# Patient Record
Sex: FEMALE | Race: BLACK OR AFRICAN AMERICAN | Employment: OTHER | URBAN - METROPOLITAN AREA
[De-identification: names, ages, dates, MRNs, and addresses within clinical notes are randomized per-mention and may not be internally consistent; named-entity substitution may affect disease eponyms.]

---

## 2017-04-03 ENCOUNTER — OFFICE VISIT (OUTPATIENT)
Dept: CARDIOLOGY CLINIC | Age: 67
End: 2017-04-03

## 2017-04-03 VITALS
RESPIRATION RATE: 20 BRPM | HEART RATE: 86 BPM | HEIGHT: 63 IN | OXYGEN SATURATION: 94 % | BODY MASS INDEX: 29.41 KG/M2 | DIASTOLIC BLOOD PRESSURE: 72 MMHG | WEIGHT: 166 LBS | SYSTOLIC BLOOD PRESSURE: 120 MMHG

## 2017-04-03 DIAGNOSIS — E11.8 TYPE 2 DIABETES MELLITUS WITH COMPLICATION, WITHOUT LONG-TERM CURRENT USE OF INSULIN (HCC): ICD-10-CM

## 2017-04-03 DIAGNOSIS — I73.9 PERIPHERAL VASCULAR DISEASE, UNSPECIFIED (HCC): ICD-10-CM

## 2017-04-03 DIAGNOSIS — E78.5 DYSLIPIDEMIA: ICD-10-CM

## 2017-04-03 DIAGNOSIS — I25.10 CORONARY ARTERY DISEASE DUE TO LIPID RICH PLAQUE: Primary | ICD-10-CM

## 2017-04-03 DIAGNOSIS — I10 ESSENTIAL HYPERTENSION, BENIGN: ICD-10-CM

## 2017-04-03 DIAGNOSIS — I25.83 CORONARY ARTERY DISEASE DUE TO LIPID RICH PLAQUE: Primary | ICD-10-CM

## 2017-04-03 NOTE — PATIENT INSTRUCTIONS
[email or web address].CL Activation    Thank you for requesting access to [email or web address]. Please follow the instructions below to securely access and download your online medical record. [email or web address] allows you to send messages to your doctor, view your test results, renew your prescriptions, schedule appointments, and more. How Do I Sign Up? 1. In your internet browser, go to [email or web address]  2. Click on the First Time User? Click Here link in the Sign In box. You will be redirect to the New Member Sign Up page. 3. Enter your JUNTA.CL Access Code exactly as it appears below. You will not need to use this code after youve completed the sign-up process. If you do not sign up before the expiration date, you must request a new code. [email or web address].CL Access Code: [email or web address]  Expires: 2017 12:53 PM (This is the date your [email or web address].CL access code will )    4. Enter the last four digits of your Social Security Number (xxxx) and Date of Birth (mm/dd/yyyy) as indicated and click Submit. You will be taken to the next sign-up page. 5. Create a JUNTA.CL ID. This will be your JUNTA.CL login ID and cannot be changed, so think of one that is secure and easy to remember. 6. Create a JUNTA.CL password. You can change your password at any time. 7. Enter your Password Reset Question and Answer. This can be used at a later time if you forget your password. 8. Enter your e-mail address. You will receive e-mail notification when new information is available in [address]. 9. Click Sign Up. You can now view and download portions of your medical record. 10. Click the Download Summary menu link to download a portable copy of your medical information. Additional Information    If you have questions, please visit the Frequently Asked Questions section of the [email or web address] website at [email or web address] Remember, [email or web address] is NOT to be used for urgent needs. For medical emergencies, dial 911.            Learning About Coronary Artery Disease (CAD)  What is coronary artery disease? Coronary artery disease (CAD) occurs when plaque builds up in the arteries that bring oxygen-rich blood to your heart. Plaque is a fatty substance made of cholesterol, calcium, and other substances in the blood. This process is called hardening of the arteries, or atherosclerosis. What happens when you have coronary artery disease? · Plaque may narrow the coronary arteries. Narrowed arteries cause poor blood flow. This can lead to angina symptoms such as chest pain or discomfort. If blood flow is completely blocked, you could have a heart attack. · You can slow CAD and reduce the risk of future problems by making changes in your lifestyle. These include quitting smoking and eating heart-healthy foods. · Treatments for CAD, along with changes in your lifestyle, can help you live a longer and healthier life. How can you prevent coronary artery disease? · Do not smoke. It may be the best thing you can do to prevent heart disease. If you need help quitting, talk to your doctor about stop-smoking programs and medicines. These can increase your chances of quitting for good. · Be active. Get at least 30 minutes of exercise on most days of the week. Walking is a good choice. You also may want to do other activities, such as running, swimming, cycling, or playing tennis or team sports. · Eat heart-healthy foods. Eat more fruits and vegetables and less foods that contain saturated and trans fats. Limit alcohol, sodium, and sweets. · Stay at a healthy weight. Lose weight if you need to. · Manage other health problems such as diabetes, high blood pressure, and high cholesterol. · Manage stress. Stress can hurt your heart. To keep stress low, talk about your problems and feelings. Don't keep your feelings hidden. · If you have talked about it with your doctor, take a low-dose aspirin every day.  Aspirin can help certain people lower their risk of a heart attack or stroke. But taking aspirin isn't right for everyone, because it can cause serious bleeding. Do not start taking daily aspirin unless your doctor knows about it. How is coronary artery disease treated? · Your doctor will suggest that you make lifestyle changes. For example, your doctor may ask you to eat healthy foods, quit smoking, lose extra weight, and be more active. · You will have to take medicines. · Your doctor may suggest a procedure to open narrowed or blocked arteries. This is called angioplasty. Or your doctor may suggest using healthy blood vessels to create detours around narrowed or blocked arteries. This is called bypass surgery. Follow-up care is a key part of your treatment and safety. Be sure to make and go to all appointments, and call your doctor if you are having problems. It's also a good idea to know your test results and keep a list of the medicines you take. Where can you learn more? Go to http://agapito-lilian.info/. Enter [phone] in the search box to learn more about \"Learning About Coronary Artery Disease (CAD). \"  Current as of: January 27, 2016  Content Version: 11.2  © 9101-6390 [employer]. Care instructions adapted under license by [employer] (which disclaims liability or warranty for this information). If you have questions about a medical condition or this instruction, always ask your healthcare professional. [address] any warranty or liability for your use of this information.

## 2017-04-03 NOTE — PROGRESS NOTES
Subjective:     Problem List  Date Reviewed: 4/3/2017          Codes Class Noted    Coronary artery disease ICD-10-CM: I25.10  ICD-9-CM: 414.00  1/16/2013    Overview Signed 1/16/2013 10:40 AM by Mojgan Capps MD     a. Cath (12/4/3): LAD p30, m40. RI 95. LCx small. RCA p99, m80; LVBr 40. EF 55%. b. PCI (12/4/3): RI 2.5x18 Cypher. pRCA 3.0x12 Vision, mRCA 2.5x23 Cypher.  c. Cath (9/7/6): LAD m30. LCx p40. RCA d40. EF 65%. No AVG/MR. Peripheral vascular disease ICD-10-CM: I73.9  ICD-9-CM: 443.9  1/16/2013    Overview Signed 1/16/2013 10:41 AM by Mojgan Capps MD     a. ABIs (5/23/3): Right 0.66, Left 0.64.  b. Angio (9/7/6): Mild bilateral renal and iliac disease. Right SFA 85%, left SFA 80%. 3 vessel runoff bilaterally. c. PTA Right SFA (9/14/6): 6x120 Protege. d. PTA Left SFA (9/21/6): 6.0x120 Protege. Dyslipidemia ICD-10-CM: E78.5  ICD-9-CM: 272.4  1/16/2013        Essential hypertension, benign ICD-10-CM: I10  ICD-9-CM: 401.1  1/16/2013        Diabetes mellitus ([facility] [address] ICD-10-CM: E11.9  ICD-9-CM: 250.00  1/16/2013              Ms. Uriel Bynum is a 77 y.o. woman with the above past medical history, who presents for follow up of her coronary artery disease. She is doing well from a cardiac standpoint. She tries to exercise on what sounds like an elliptical type machine about three times per week for about ten minutes at a time. She does this without any active cardiopulmonary symptoms. The only thing that she gets that limits her physical activity is that of left hip pain. She denies any chest pain, chest discomfort, shortness of breath, dyspnea on exertion, orthopnea, paroxysmal nocturnal dyspnea, lower extremity swelling, palpitations, syncope or near syncope. Her primary care physician is following her lipids.           History   Smoking Status    Former Smoker    Quit date: 11/1/2015   Smokeless Tobacco    Never Used       Current Outpatient Prescriptions   Medication Sig Dispense Refill    pyridoxine (VITAMIN B-6) 25 mg tablet Take 25 mg by mouth daily.  cyanocobalamin (VITAMIN B12) 500 mcg tablet Take 500 mcg by mouth daily.  MULTIVITAMIN WITH MINERALS (HAIR,SKIN & NAILS PO) Take  by mouth.  cholecalciferol, vitamin D3, 2,000 unit tab Take  by mouth daily.  cetirizine (ZYRTEC) 10 mg tablet Take 10 mg by mouth daily.  aspirin 81 mg tablet Take 81 mg by mouth.  losartan-hydrochlorothiazide (HYZAAR) 100-25 mg per tablet Take 1 Tab by mouth daily.  Nisoldipine 34 mg Tb24 Take  by mouth daily.  metFORMIN (GLUCOPHAGE) 500 mg tablet Take 1,000 mg by mouth two (2) times daily (with meals).  sitaGLIPtin (JANUVIA) 50 mg tablet Take 50 mg by mouth daily.  atorvastatin (LIPITOR) 40 mg tablet Take  by mouth daily.  CALCIUM CARBONATE/VITAMIN D3 (CALCARB 600 WITH VITAMIN D PO) Take  by mouth. Objective:     Visit Vitals    /72 (BP 1 Location: Left arm, BP Patient Position: Sitting)    Pulse 86    Resp 20    Ht 5' 3\" (1.6 m)    Wt 166 lb (75.3 kg)    SpO2 94%    BMI 29.41 kg/m2       HEENT Exam:     Normocephalic, atraumatic. EOMI. Oropharynx negative. Neck supple. No lymphadenopathy. Lung Exam:     The patient is not dyspneic. There is no cough. The lungs are clear to percussion. Breath sounds are heard equally in all lung fields. There are no wheezes, rales, rhonchi, or rubs heard on auscultation. Heart Exam:     The rhythm is regular. The PMI is in the 5th intercostal space of the MCL. Apical impulse is normal. S1 is regular. S2 is physiologic. There is no S3, S4 gallop, click, or rub. 2/6 SM @ LUSB with local radiation. Abdomen Exam:     Bowel sounds are normoactive. Abdomen benign. Extremities Exam:     The extremities are atraumatic appearing. There is no clubbing, cyanosis, edema, ulcers, varicose veins, rash, swelling, erythemia noted in the extremities.  The neurovascular status is grossly intact with normal distal sensation and pulses. Vascular Exam:     The radial, brachial, dorsalis pedis, posterior tibial, are equal and strong bilaterally The carotids are equal bilaterally without bruits. EKG: NSR @ 90, 2 fusion beats, IL NSSTTW abn. Assessment/Plan:     Ms. Bernardo Quintana appears stable from a cardiac standpoint. We are going to stay the course with her current medication regimen, and she is going to follow up with me in one year's time. We discussed diet and exercise. Plan:  1. Continue outpatient medication regimen. 2. Follow up with me in one year's time. 3. Diet and exercise. 4. Call my office, call her primary care physician, or return to the hospital should any concerning symptomatology arise. Ms. Bernardo Quintana indicated that she understood this plan and wished to proceed ahead.              Patient Care Team:  Beatriz Landeros MD as PCP - General (Family Practice)  Keesha Ricci MD as Physician (Cardiology)

## 2017-04-03 NOTE — MR AVS SNAPSHOT
Visit Information Date & Time Provider Department Dept. Phone Encounter #  
 4/3/2017  1:00 PM Sarah Garrison MD [facility] T[facility] Tam[facility]o [phone] [id number] Upcoming Health Maintenance Date Due Hepatitis C Screening 1950 HEMOGLOBIN A1C Q6M 1950 LIPID PANEL Q1 1950 FOOT EXAM Q1 5/27/1960 MICROALBUMIN Q1 5/27/1960 EYE EXAM RETINAL OR DILATED Q1 5/27/1960 DTaP/Tdap/Td series (1 - Tdap) 5/27/1971 BREAST CANCER SCRN MAMMOGRAM 5/27/2000 FOBT Q 1 YEAR AGE 50-75 5/27/2000 ZOSTER VACCINE AGE 60> 5/27/2010 GLAUCOMA SCREENING Q2Y 5/27/2015 OSTEOPOROSIS SCREENING (DEXA) 5/27/2015 Pneumococcal 65+ Low/Medium Risk (1 of 2 - PCV13) 5/27/2015 MEDICARE YEARLY EXAM 5/27/2015 INFLUENZA AGE 9 TO ADULT 8/1/2016 Allergies as of 4/3/2017  Review Complete On: 4/3/2017 By: Sarah Garrison MD  
 No Known Allergies Current Immunizations  Never Reviewed No immunizations on file. Not reviewed this visit You Were Diagnosed With   
  
 Codes Comments Coronary artery disease due to lipid rich plaque    -  Primary ICD-10-CM: I25.10, I25.83 ICD-9-CM: 414.00, 414.3 Vitals BP Pulse Resp Height(growth percentile) Weight(growth percentile) SpO2  
 120/72 (BP 1 Location: Left arm, BP Patient Position: Sitting) 86 20 5' 3\" (1.6 m) 166 lb (75.3 kg) 94% BMI OB Status Smoking Status 29.41 kg/m2 Postmenopausal Former Smoker Vitals History BMI and BSA Data Body Mass Index Body Surface Area  
 29.41 kg/m 2 1.83 m 2 Preferred Pharmacy Pharmacy Name Phone  [address] [phone] Your Updated Medication List  
  
   
This list is accurate as of: 4/3/17  1:08 PM.  Always use your most recent med list.  
  
  
  
  
 aspirin 81 mg tablet Take 81 mg by mouth. CALCARB 600 WITH VITAMIN D PO Take  by mouth. cetirizine 10 mg tablet Commonly known as:  ZYRTEC Take 10 mg by mouth daily. cholecalciferol (vitamin D3) 2,000 unit Tab Take  by mouth daily. cyanocobalamin 500 mcg tablet Commonly known as:  VITAMIN B12 Take 500 mcg by mouth daily. HAIR,SKIN & NAILS PO Take  by mouth. JANUVIA 50 mg tablet Generic drug:  SITagliptin Take 50 mg by mouth daily. LIPITOR 40 mg tablet Generic drug:  atorvastatin Take  by mouth daily. losartan-hydroCHLOROthiazide 100-25 mg per tablet Commonly known as:  HYZAAR Take 1 Tab by mouth daily. metFORMIN 500 mg tablet Commonly known as:  GLUCOPHAGE Take 1,000 mg by mouth two (2) times daily (with meals). Nisoldipine 34 mg Tb24 SR tablet Commonly known as:  Rangel Gibson Take  by mouth daily. pyridoxine (vitamin B6) 25 mg tablet Commonly known as:  VITAMIN B-6 Take 25 mg by mouth daily. We Performed the Following AMB POC EKG ROUTINE W/ 12 LEADS, INTER & REP [94186 CPT(R)] Introducing [facility]! [facility] introduces Revelation patient portal. Now you can access parts of your medical record, email your doctor's office, and request medication refills online. 1. In your internet browser, go to [email or web address] 2. Click on the First Time User? Click Here link in the Sign In box. You will see the New Member Sign Up page. 3. Enter your Revelation Access Code exactly as it appears below. You will not need to use this code after youve completed the sign-up process. If you do not sign up before the expiration date, you must request a new code. · Revelation Access Code: [email or web address] Expires: 7/2/2017 12:53 PM 
 
4. Enter the last four digits of your Social Security Number (xxxx) and Date of Birth (mm/dd/yyyy) as indicated and click Submit. You will be taken to the next sign-up page. 5. Create a Revelation ID.  This will be your Revelation login ID and cannot be changed, so think of one that is secure and easy to remember. 6. Create a Fashion Movement password. You can change your password at any time. 7. Enter your Password Reset Question and Answer. This can be used at a later time if you forget your password. 8. Enter your e-mail address. You will receive e-mail notification when new information is available in [address] 9. Click Sign Up. You can now view and download portions of your medical record. 10. Click the Download Summary menu link to download a portable copy of your medical information. If you have questions, please visit the Frequently Asked Questions section of the Fashion Movement website. Remember, Fashion Movement is NOT to be used for urgent needs. For medical emergencies, dial 911. Now available from your iPhone and Android! Please provide this summary of care documentation to your next provider. Your primary care clinician is listed as Ashley Malloy. If you have any questions after today's visit, please call [phone].

## 2017-11-08 ENCOUNTER — TELEPHONE (OUTPATIENT)
Dept: CARDIOLOGY CLINIC | Age: 67
End: 2017-11-08

## 2017-11-08 NOTE — TELEPHONE ENCOUNTER
Patient is due to have a left femoral topliteal bypass 11/27/17 with dr Dilcia Navarro at [facility] they need a cardiac clearance  Phone: [phone]  Fax: [phone]

## 2017-11-13 ENCOUNTER — OFFICE VISIT (OUTPATIENT)
Dept: CARDIOLOGY CLINIC | Age: 67
End: 2017-11-13

## 2017-11-13 VITALS
HEART RATE: 80 BPM | DIASTOLIC BLOOD PRESSURE: 64 MMHG | HEIGHT: 63 IN | RESPIRATION RATE: 20 BRPM | OXYGEN SATURATION: 96 % | SYSTOLIC BLOOD PRESSURE: 110 MMHG | BODY MASS INDEX: 28.53 KG/M2 | WEIGHT: 161 LBS

## 2017-11-13 DIAGNOSIS — I25.10 CORONARY ARTERY DISEASE INVOLVING NATIVE CORONARY ARTERY OF NATIVE HEART WITHOUT ANGINA PECTORIS: ICD-10-CM

## 2017-11-13 DIAGNOSIS — Z01.810 PREOP CARDIOVASCULAR EXAM: Primary | ICD-10-CM

## 2017-11-13 DIAGNOSIS — I10 ESSENTIAL HYPERTENSION, BENIGN: ICD-10-CM

## 2017-11-13 DIAGNOSIS — E11.8 TYPE 2 DIABETES MELLITUS WITH COMPLICATION, WITHOUT LONG-TERM CURRENT USE OF INSULIN (HCC): ICD-10-CM

## 2017-11-13 DIAGNOSIS — E78.5 DYSLIPIDEMIA: ICD-10-CM

## 2017-11-13 DIAGNOSIS — I73.9 PERIPHERAL VASCULAR DISEASE (HCC): ICD-10-CM

## 2017-11-13 RX ORDER — ROSUVASTATIN CALCIUM 20 MG/1
20 TABLET, COATED ORAL
COMMUNITY

## 2017-11-13 RX ORDER — CLOPIDOGREL BISULFATE 75 MG/1
TABLET ORAL DAILY
COMMUNITY

## 2017-11-13 NOTE — PATIENT INSTRUCTIONS
LearnZillion Activation    Thank you for requesting access to Learn[employer]illion. Please follow the instructions below to securely access and download your online medical record. LearnZillion allows you to send messages to your doctor, view your test results, renew your prescriptions, schedule appointments, and more. How Do I Sign Up? 1. In your internet browser, go to [email or web address]  2. Click on the First Time User? Click Here link in the Sign In box. You will be redirect to the New Member Sign Up page. 3. Enter your LearnZillion Access Code exactly as it appears below. You will not need to use this code after youve completed the sign-up process. If you do not sign up before the expiration date, you must request a new code. LearnZillion Access Code: [email or web address]  Expires: 2018 12:03 PM (This is the date your LearnZillion access code will )    4. Enter the last four digits of your Social Security Number (xxxx) and Date of Birth (mm/dd/yyyy) as indicated and click Submit. You will be taken to the next sign-up page. 5. Create a LearnZillion ID. This will be your LearnZillion login ID and cannot be changed, so think of one that is secure and easy to remember. 6. Create a LearnZillion password. You can change your password at any time. 7. Enter your Password Reset Question and Answer. This can be used at a later time if you forget your password. 8. Enter your e-mail address. You will receive e-mail notification when new information is available in [address]. 9. Click Sign Up. You can now view and download portions of your medical record. 10. Click the Download Summary menu link to download a portable copy of your medical information. Additional Information    If you have questions, please visit the Frequently Asked Questions section of the LearnZillion website at [email or web address] Remember, LearnZillion is NOT to be used for urgent needs. For medical emergencies, dial 911.            Coronary Artery Disease: Care Instructions  Your Care Instructions    The heart is a muscle, and like any muscle, it needs blood to work well. Coronary artery disease occurs when the arteries that bring oxygen-rich blood to your heart have a buildup of plaque-deposits of fats and other substances. Plaque can reduce blood flow to the heart muscle. This can cause angina symptoms such as chest pain or pressure. A heart attack can happen if blood flow is completely blocked. You can do a lot to improve your health and prevent a heart attack. Eating healthy food, not smoking, getting regular exercise, and taking your medicine are the main things you can do every day to stay healthy. Follow-up care is a key part of your treatment and safety. Be sure to make and go to all appointments, and call your doctor if you are having problems. It's also a good idea to know your test results and keep a list of the medicines you take. How can you care for yourself at home? Medicines  ? · Be safe with medicines. Take your medicines exactly as prescribed. Call your doctor if you think you are having a problem with your medicine. You will get more details on the specific medicines your doctor prescribes. You may need several medicines. ¨ Angiotensin-converting enzyme (ACE) inhibitors, angiotensin II receptor blockers (ARBs), beta-blockers, and statins can help prevent a heart attack. ACE inhibitors, ARBs, and beta-blockers help lower your blood pressure. Statins help lower cholesterol, which is a type of fat that can clog your arteries. ¨ Nitrates can help make chest pain happen less often. ¨ Aspirin and other blood thinners help prevent heart attacks and strokes. ? · If your doctor has given you nitroglycerin for angina symptoms (such as chest pain or pressure) keep it with you at all times. If you have symptoms, sit down and rest, and take the first dose of nitroglycerin as directed.  If your symptoms get worse or are not getting better within 5 minutes, call 911 right away. Stay on the phone. The emergency  will give you further instructions. ? · Be sure to tell your doctor about any angina symptoms that you have had, even if they went away. ? · Do not take any over-the-counter medicines, vitamins, or herbal products without talking to your doctor first.   ? Lifestyle  ? Ask your doctor if a cardiac rehab program is right for you. Cardiac rehab can help you make lifestyle changes. In cardiac rehab, a team of health professionals provides education and support to help you make new, healthy habits. ? · Do not smoke. Avoid secondhand smoke too. Smoking can increase your risk of a heart attack or stroke. If you need help quitting, talk to your doctor about stop-smoking programs and medicines. These can increase your chances of quitting for good. ? · Eat a heart-healthy diet that is high in fiber and low in saturated fat and sodium. ¨ Learn what a serving is. A \"serving\" and a \"portion\" are not always the same thing. Make sure that you are not eating larger portions than recommended. For example, a serving of pasta is ½ cup. A serving size of meat is 2 to 3 ounces; a 3-ounce serving is about the size of a deck of cards. ¨ Eat a variety of grain products every day. Include whole-grain foods such as oats, whole wheat bread, and brown rice. ¨ Eat fish, skinless poultry, lean meats, and soy products such as tofu instead of high-fat meats. Cut out all visible fat when you prepare meat. Eat at least 2 servings of fish a week. ¨ Eat a variety of fruit and vegetables every day. They have lots of nutrients that help protect against heart disease, and they have little-if any-fat. Keep carrots, celery, and other veggies handy for snacks. Buy fruit that is in season and store it where you can see it so that you will be tempted to eat it. Cook dishes that have a lot of veggies in them, such as stir-fried dishes and soups.   ¨ Read food labels and try to avoid saturated fat and trans fat. They increase your risk of heart disease. Bake, broil, grill, or steam foods instead of frying them. Use olive or canola oil when you cook. Try cholesterol-lowering spreads, such as Benecol or Take Control. ¨ Limit sodium. Your doctor may recommend that you limit sodium to less than 1,500 mg a day. ¨ Limit processed foods, including cookies and crackers. ¨ Limit drinks and foods with added sugar. ¨ Choose low-fat or fat-free milk and dairy products. ¨ Limit alcohol to 2 drinks a day for men and 1 drink a day for women. Too much alcohol can cause health problems. ? · If your doctor recommends it, get more exercise. Walking is a good choice. Bit by bit, increase the amount you walk every day. Try for at least 30 minutes on most days of the week. You also may want to swim, bike, or do other activities. ? · Stay at a healthy weight. Lose weight if you need to.   ? · Talk to your family, friends, or a therapist about your feelings. It is normal to feel upset about having this disease and to feel afraid of having a heart attack. Talking openly about your feelings can help you cope. If you think you have symptoms of depression, talk to your doctor. ? · Avoid colds and flu. Get a pneumococcal vaccine shot. If you have had one before, ask your doctor whether you need another dose. Get a flu vaccine every year. If you must be around people with colds or flu, wash your hands often. When should you call for help? Call 911 anytime you think you may need emergency care. For example, call if:  ? · You have symptoms of a heart attack. These may include:  ¨ Chest pain or pressure, or a strange feeling in the chest.  ¨ Sweating. ¨ Shortness of breath. ¨ Nausea or vomiting. ¨ Pain, pressure, or a strange feeling in the back, neck, jaw, or upper belly or in one or both shoulders or arms. ¨ Lightheadedness or sudden weakness. ¨ A fast or irregular heartbeat.   After you call 911, the  may tell you to chew 1 adult-strength or 2 to 4 low-dose aspirin. Wait for an ambulance. Do not try to drive yourself. ? · You have angina symptoms (such as chest pain or pressure) that do not go away with rest or are not getting better within 5 minutes after you take a dose of nitroglycerin. ? · You passed out (lost consciousness). ?Call your doctor now or seek immediate medical care if:  ? · You are having angina symptoms, such as chest pain or pressure, more often than usual, or they are different or worse than usual.   ? · You have new or increased shortness of breath. ? · You are dizzy or lightheaded, or you feel like you may faint. ? Watch closely for changes in your health, and be sure to contact your doctor if you have any problems. Where can you learn more? Go to http://agapito-lilian.info/. Enter R389 in the search box to learn more about \"Coronary Artery Disease: Care Instructions. \"  Current as of: September 21, 2016  Content Version: 11.4  © 8827-3957 Performable. Care instructions adapted under license by [employer] (which disclaims liability or warranty for this information). If you have questions about a medical condition or this instruction, always ask your healthcare professional. [address] any warranty or liability for your use of this information.

## 2017-11-13 NOTE — PROGRESS NOTES
Subjective:     Problem List  Date Reviewed: 11/13/2017          Codes Class Noted    Coronary artery disease ICD-10-CM: I25.10  ICD-9-CM: 414.00  1/16/2013    Overview Signed 1/16/2013 10:40 AM by Elisa Fisher MD     a. Cath (12/4/3): LAD p30, m40. RI 95. LCx small. RCA p99, m80; LVBr 40. EF 55%. b. PCI (12/4/3): RI 2.5x18 Cypher. pRCA 3.0x12 Vision, mRCA 2.5x23 Cypher.  c. Cath (9/7/6): LAD m30. LCx p40. RCA d40. EF 65%. No AVG/MR. Peripheral vascular disease ICD-10-CM: I73.9  ICD-9-CM: 443.9  1/16/2013    Overview Signed 1/16/2013 10:41 AM by Elisa Fisher MD     a. ABIs (5/23/3): Right 0.66, Left 0.64.  b. Angio (9/7/6): Mild bilateral renal and iliac disease. Right SFA 85%, left SFA 80%. 3 vessel runoff bilaterally. c. PTA Right SFA (9/14/6): 6x120 Protege. d. PTA Left SFA (9/21/6): 6.0x120 Protege. Dyslipidemia ICD-10-CM: E78.5  ICD-9-CM: 272.4  1/16/2013        Essential hypertension, benign ICD-10-CM: I10  ICD-9-CM: 401.1  1/16/2013        Diabetes mellitus ([facility] [address] ICD-10-CM: E11.9  ICD-9-CM: 250.00  1/16/2013              Ms. Princess Whittington is a 79 y.o. woman with the above past medical history, who presents for preoperative evaluation prior to left lower extremity vascular bypass surgery. This os tentatively scheduled for 11/27/17, [facility].     She is doing well from a cardiac standpoint. She denies any chest pain, chest discomfort, shortness of breath, dyspnea on exertion, orthopnea, paroxysmal nocturnal dyspnea, lower extremity swelling, palpitations, syncope or near syncope. The thing that limits her physical activity is that of left thigh pain with walking. She had stents placed in her right lower extremity recently as well. Her right lower extremity seems to be feeling fine. Her primary care physician is following her lipid status.          History   Smoking Status    Former Smoker    Quit date: 11/1/2015   Smokeless Tobacco    Never Used Current Outpatient Prescriptions   Medication Sig Dispense Refill    rosuvastatin (CRESTOR) 20 mg tablet Take 20 mg by mouth nightly.  clopidogrel (PLAVIX) 75 mg tab Take  by mouth.  pyridoxine (VITAMIN B-6) 25 mg tablet Take 25 mg by mouth daily.  cyanocobalamin (VITAMIN B12) 500 mcg tablet Take 500 mcg by mouth daily.  MULTIVITAMIN WITH MINERALS (HAIR,SKIN & NAILS PO) Take  by mouth.  cholecalciferol, vitamin D3, 2,000 unit tab Take  by mouth daily.  cetirizine (ZYRTEC) 10 mg tablet Take 10 mg by mouth daily.  aspirin 81 mg tablet Take 81 mg by mouth.  losartan-hydrochlorothiazide (HYZAAR) 100-25 mg per tablet Take 1 Tab by mouth daily.  Nisoldipine 34 mg Tb24 Take  by mouth daily.  metFORMIN (GLUCOPHAGE) 500 mg tablet Take 1,000 mg by mouth two (2) times daily (with meals).  sitaGLIPtin (JANUVIA) 50 mg tablet Take 50 mg by mouth daily.  CALCIUM CARBONATE/VITAMIN D3 (CALCARB 600 WITH VITAMIN D PO) Take  by mouth. Objective:     Visit Vitals    /64 (BP 1 Location: Left arm, BP Patient Position: Sitting)    Pulse 80    Resp 20    Ht 5' 3\" (1.6 m)    Wt 161 lb (73 kg)    SpO2 96%    BMI 28.52 kg/m2       HEENT Exam:     Normocephalic, atraumatic. EOMI. Oropharynx negative. Neck supple. No lymphadenopathy. Lung Exam:     The patient is not dyspneic. There is no cough. The lungs are clear to percussion. Breath sounds are heard equally in all lung fields. There are no wheezes, rales, rhonchi, or rubs heard on auscultation. Heart Exam:     The rhythm is regular. The PMI is in the 5th intercostal space of the MCL. Apical impulse is normal. S1 is regular. S2 is physiologic. There is no S3, S4 gallop, click, or rub. 2/6 decrescendo SM @ RUSB with local radiation. Abdomen Exam:     Bowel sounds are normoactive. Abdomen benign. Extremities Exam:     The extremities are atraumatic appearing.  There is no clubbing, cyanosis, edema, ulcers, varicose veins, rash, erythemia noted in the extremities. The neurovascular status is grossly intact with normal distal sensation and pulses. Vascular Exam:     The radial, brachial, dorsalis pedis, posterior tibial, are equal and strong bilaterally The carotids are equal bilaterally without bruits. EKG: NSR @ 85, NSSTTW abn. Assessment/Plan:     Overall Ms. Sharren Kayser appears stable from a cardiac standpoint. Given that she has diabetes, and a history of coronary artery disease, and given that she is undergoing vascular bypass surgery, I feel it would be most prudent to risk stratify her with a Lexiscan Cardiolite Nuclear Stress Test.  If this pans out to be unremarkable, then I think that she would be a relatively low risk patient from a heart standpoint who will be undergoing a high risk (vascular) surgical procedure and can proceed ahead without further cardiac testing. She has a follow up all ready in April. We discussed diet and exercise. She smoked two cigarettes since she was told that she needs surgery. I advised her against this. She usually does not smoke, but smoked because she was feeling overly stressed. Plan:  1. Continue outpatient medication regimen. 2. Lexiwalk Cardiolite Nuclear Stress Test   3. Follow up in April or sooner pending the results of #2.  4. Diet and exercise. 5. Call my office, call her primary care physician, or return to the hospital should any concerning symptomatology arise. Ms. Sharren Kayser indicated that she understood this plan and wished to proceed ahead.              Patient Care Team:  Robin Estrada MD as PCP - General (Family Practice)  Raudel Baldwin MD as Physician (Cardiology)  Estella Gonzalez MD (Vascular Surgery)

## 2017-11-13 NOTE — MR AVS SNAPSHOT
Visit Information Date & Time Provider Department Dept. Phone Encounter #  
 11/13/2017 12:40 PM Romel Aleman MD [facility] [phone] [id number] Your Appointments 11/13/2017 12:40 PM  
ESTABLISHED PATIENT with Romel Aleman MD  
[facility] ([facility] SCHEDUL[facility]) Appt Note: preop [address] [place] [address] [facility]  
[phone]  
  
   
 [address] [place] [address] [place] [address]  
  
    
 4/9/2018  1:00 PM  
ESTABLISHED PATIENT with Romel Aleman MD  
[facility] ([facility]) Appt Note: annual  
 [address] [place] [address] [facility]  
[phone] Upcoming Health Maintenance Date Due Hepatitis C Screening 1950 HEMOGLOBIN A1C Q6M 1950 LIPID PANEL Q1 1950 FOOT EXAM Q1 5/27/1960 MICROALBUMIN Q1 5/27/1960 EYE EXAM RETINAL OR DILATED Q1 5/27/1960 DTaP/Tdap/Td series (1 - Tdap) 5/27/1971 BREAST CANCER SCRN MAMMOGRAM 5/27/2000 FOBT Q 1 YEAR AGE 50-75 5/27/2000 ZOSTER VACCINE AGE 60> 3/27/2010 GLAUCOMA SCREENING Q2Y 5/27/2015 OSTEOPOROSIS SCREENING (DEXA) 5/27/2015 Pneumococcal 65+ Low/Medium Risk (1 of 2 - PCV13) 5/27/2015 MEDICARE YEARLY EXAM 5/27/2015 Influenza Age 5 to Adult 8/1/2017 Allergies as of 11/13/2017  Review Complete On: 11/13/2017 By: Ralph Cole LPN No Known Allergies Current Immunizations  Never Reviewed No immunizations on file. Not reviewed this visit You Were Diagnosed With   
  
 Codes Comments Preop cardiovascular exam    -  Primary ICD-10-CM: Z01.810 ICD-9-CM: V72.81 Vitals BP Pulse Resp Height(growth percentile) Weight(growth percentile) SpO2  
 110/64 (BP 1 Location: Left arm, BP Patient Position: Sitting) 80 20 5' 3\" (1.6 m) 161 lb (73 kg) 96% BMI OB Status Smoking Status 28.52 kg/m2 Postmenopausal Former Smoker Vitals History BMI and BSA Data Body Mass Index Body Surface Area 28.52 kg/m 2 1.8 m 2 Preferred Pharmacy Pharmacy Name Phone  [address] [phone] Your Updated Medication List  
  
   
This list is accurate as of: 11/13/17 12:22 PM.  Always use your most recent med list.  
  
  
  
  
 aspirin 81 mg tablet Take 81 mg by mouth. CALCARB 600 WITH VITAMIN D PO Take  by mouth. cetirizine 10 mg tablet Commonly known as:  ZYRTEC Take 10 mg by mouth daily. cholecalciferol (vitamin D3) 2,000 unit Tab Take  by mouth daily. CRESTOR 20 mg tablet Generic drug:  rosuvastatin Take 20 mg by mouth nightly. cyanocobalamin 500 mcg tablet Commonly known as:  VITAMIN B12 Take 500 mcg by mouth daily. HAIR,SKIN & NAILS PO Take  by mouth. JANUVIA 50 mg tablet Generic drug:  SITagliptin Take 50 mg by mouth daily. losartan-hydroCHLOROthiazide 100-25 mg per tablet Commonly known as:  HYZAAR Take 1 Tab by mouth daily. metFORMIN 500 mg tablet Commonly known as:  GLUCOPHAGE Take 1,000 mg by mouth two (2) times daily (with meals). Nisoldipine 34 mg Tb24 SR tablet Commonly known as:  Yohan  Take  by mouth daily. PLAVIX 75 mg Tab Generic drug:  clopidogrel Take  by mouth.  
  
 pyridoxine (vitamin B6) 25 mg tablet Commonly known as:  VITAMIN B-6 Take 25 mg by mouth daily. We Performed the Following AMB POC EKG ROUTINE W/ 12 LEADS, INTER & REP [21051 CPT(R)] Introducing Santosh Dover! Daniel Martinez introduces [facility] patient portal. Now you can access parts of your medical record, email your doctor's office, and request medication refills online. 1. In your internet browser, go to [email or web address] 2. Click on the First Time User? Click Here link in the Sign In box.  You will see the New Member Sign Up page. 3. Enter your [employer] Access Code exactly as it appears below. You will not need to use this code after youve completed the sign-up process. If you do not sign up before the expiration date, you must request a new code. · [employer] Access Code: [email or web address] Expires: 2/11/2018 12:03 PM 
 
4. Enter the last four digits of your Social Security Number (xxxx) and Date of Birth (mm/dd/yyyy) as indicated and click Submit. You will be taken to the next sign-up page. 5. Create a [employer] ID. This will be your [employer] login ID and cannot be changed, so think of one that is secure and easy to remember. 6. Create a [employer] password. You can change your password at any time. 7. Enter your Password Reset Question and Answer. This can be used at a later time if you forget your password. 8. Enter your e-mail address. You will receive e-mail notification when new information is available in [address]. 9. Click Sign Up. You can now view and download portions of your medical record. 10. Click the Download Summary menu link to download a portable copy of your medical information. If you have questions, please visit the Frequently Asked Questions section of the [employer] website. Remember, [employer] is NOT to be used for urgent needs. For medical emergencies, dial 911. Now available from your iPhone and Android! Please provide this summary of care documentation to your next provider. Your primary care clinician is listed as Jelena Dozier. If you have any questions after today's visit, please call [phone].

## 2017-11-14 DIAGNOSIS — R07.9 CHEST PAIN, UNSPECIFIED TYPE: Primary | ICD-10-CM

## 2017-11-21 ENCOUNTER — HOSPITAL ENCOUNTER (OUTPATIENT)
Dept: NON INVASIVE DIAGNOSTICS | Age: 67
Discharge: HOME | End: 2017-11-21
Attending: SPECIALIST
Payer: MEDICARE

## 2017-11-21 ENCOUNTER — HOSPITAL ENCOUNTER (OUTPATIENT)
Dept: NUCLEAR MEDICINE | Age: 67
Discharge: HOME | End: 2017-11-21
Attending: SPECIALIST
Payer: MEDICARE

## 2017-11-21 DIAGNOSIS — R07.9 CHEST PAIN, UNSPECIFIED TYPE: ICD-10-CM

## 2017-11-21 LAB
ATTENDING PHYSICIAN, CST07: NORMAL
DIAGNOSIS, 93000: NORMAL
DUKE TM SCORE RESULT, CST14: NORMAL
DUKE TREADMILL SCORE, CST13: NORMAL
ECG INTERP BEFORE EX, CST11: NORMAL
ECG INTERP DURING EX, CST12: NORMAL
FUNCTIONAL CAPACITY, CST17: NORMAL
KNOWN CARDIAC CONDITION, CST08: NORMAL
MAX. DIASTOLIC BP, CST04: 70 MMHG
MAX. HEART RATE, CST05: 110 BPM
MAX. SYSTOLIC BP, CST03: 155 MMHG
OVERALL BP RESPONSE TO EXERCISE, CST16: NORMAL
OVERALL HR RESPONSE TO EXERCISE, CST15: NORMAL
PEAK EX METS, CST10: 1.6 METS
PROTOCOL NAME, CST01: NORMAL
TEST INDICATION, CST09: NORMAL

## 2017-11-21 PROCEDURE — 74011250636 HC RX REV CODE- 250/636: Performed by: INTERNAL MEDICINE

## 2017-11-21 PROCEDURE — 93017 CV STRESS TEST TRACING ONLY: CPT

## 2017-11-21 PROCEDURE — 78452 HT MUSCLE IMAGE SPECT MULT: CPT

## 2017-11-21 RX ADMIN — REGADENOSON 0.4 MG: 0.08 INJECTION, SOLUTION INTRAVENOUS at 10:03

## 2017-11-22 ENCOUNTER — TELEPHONE (OUTPATIENT)
Dept: CARDIOLOGY CLINIC | Age: 67
End: 2017-11-22

## 2017-11-22 NOTE — TELEPHONE ENCOUNTER
Call received from Rebecca Almeida with [facility]. Patient is scheduled to have surgery on 11/27 and clearance is needed. Office is closed on Friday as well. Please call at [phone]. Thanks!

## 2017-11-22 NOTE — TELEPHONE ENCOUNTER
Pt in need of cardiac clearance letter with Plavix instructions for 11/27/17 for vascular bypass surgery. Last OV suggest Benedict Ket prior to surgery which was completed 11/21/17, and appears to be normal.     Please advise.

## 2017-11-26 NOTE — PROGRESS NOTES
Lexiscan Cardiolite stress test revealed normal myocardial perfusion and normal LV function. Ms. Yohana Jiménez is a Low Risk patient from a cardiac perspective who will be undergoing a High Risk (vascular bypass) procedure. I recommend proceeding ahead with the procedure without further cardiac testing at this time. Regarding her plavix, this should be stopped at least 5 days prior to the procedure and resumed as soon as feasible post-procedure. Thanks.

## 2018-04-17 ENCOUNTER — OFFICE VISIT (OUTPATIENT)
Dept: CARDIOLOGY CLINIC | Age: 68
End: 2018-04-17

## 2018-04-17 VITALS
RESPIRATION RATE: 16 BRPM | OXYGEN SATURATION: 98 % | BODY MASS INDEX: 29.26 KG/M2 | SYSTOLIC BLOOD PRESSURE: 110 MMHG | DIASTOLIC BLOOD PRESSURE: 56 MMHG | WEIGHT: 159 LBS | HEIGHT: 62 IN | HEART RATE: 84 BPM

## 2018-04-17 DIAGNOSIS — I10 ESSENTIAL HYPERTENSION, BENIGN: ICD-10-CM

## 2018-04-17 DIAGNOSIS — Z01.810 PREOP CARDIOVASCULAR EXAM: ICD-10-CM

## 2018-04-17 DIAGNOSIS — E78.5 DYSLIPIDEMIA: ICD-10-CM

## 2018-04-17 DIAGNOSIS — I73.9 PERIPHERAL VASCULAR DISEASE (HCC): ICD-10-CM

## 2018-04-17 DIAGNOSIS — I25.10 CORONARY ARTERY DISEASE INVOLVING NATIVE CORONARY ARTERY OF NATIVE HEART WITHOUT ANGINA PECTORIS: Primary | ICD-10-CM

## 2018-04-17 NOTE — PROGRESS NOTES
Visit Vitals    /56 (BP 1 Location: Left arm, BP Patient Position: Sitting)    Pulse 84    Resp 16    Ht 5' 2\" (1.575 m)    Wt 159 lb (72.1 kg)    SpO2 98%    BMI 29.08 kg/m2     Pt presents in office today without complaint. She needs clearance for R rotator cuff surgery.

## 2018-04-17 NOTE — PATIENT INSTRUCTIONS

## 2018-04-17 NOTE — PROGRESS NOTES
June Napoles MD. [facility] [place]              Patient: Subha Crews  : 1950      Today's Date: 2018            HISTORY OF PRESENT ILLNESS:     History of Present Illness:  Here for follow-up. She has rotator cuff surgery planned for Monday. No cardiac complaints. No more leg pain since vascular surgery. She can walk up a flight of stairs OK. PAST MEDICAL HISTORY:     Past Medical History:   Diagnosis Date    Coronary artery disease 2013    Diabetes mellitus ([address] 2013    Dyslipidemia 2013    Essential hypertension, benign 2013    Peripheral vascular disease 2013           MEDICATIONS:     Current Outpatient Prescriptions   Medication Sig Dispense Refill    rosuvastatin (CRESTOR) 20 mg tablet Take 20 mg by mouth nightly.  clopidogrel (PLAVIX) 75 mg tab Take  by mouth.  cholecalciferol, vitamin D3, 2,000 unit tab Take  by mouth daily.  cetirizine (ZYRTEC) 10 mg tablet Take 10 mg by mouth daily.  aspirin 81 mg tablet Take 81 mg by mouth.  losartan-hydrochlorothiazide (HYZAAR) 100-25 mg per tablet Take 1 Tab by mouth daily.  Nisoldipine 34 mg Tb24 Take  by mouth daily.  metFORMIN (GLUCOPHAGE) 500 mg tablet Take 500 mg by mouth two (2) times daily (with meals).  sitaGLIPtin (JANUVIA) 50 mg tablet Take 50 mg by mouth daily.  CALCIUM CARBONATE/VITAMIN D3 (CALCARB 600 WITH VITAMIN D PO) Take  by mouth. No Known Allergies          SOCIAL HISTORY:     Social History   Substance Use Topics    Smoking status: Current Some Day Smoker     Types: Cigarettes    Smokeless tobacco: Never Used    Alcohol use No         FAMILY HISTORY:     Family History   Problem Relation Age of Onset    Heart Attack Mother              REVIEW OF SYMPTOMS:     Review of Symptoms:  Constitutional: Negative for fever, chills  HEENT: Negative for nosebleeds, tinnitus, and vision changes.    Respiratory: Negative for cough, wheezing  Cardiovascular: Negative for orthopnea, syncope, and PND. Gastrointestinal: Negative for abdominal pain, diarrhea, melena. Genitourinary: Negative for dysuria  Musculoskeletal: Negative for myalgias. Skin: Negative for rash  Heme: No problems bleeding. Neurological: Negative for speech change and focal weakness. PHYSICAL EXAM:     Physical Exam:  Visit Vitals    /56 (BP 1 Location: Left arm, BP Patient Position: Sitting)    Pulse 84    Resp 16    Ht 5' 2\" (1.575 m)    Wt 159 lb (72.1 kg)    SpO2 98%    BMI 29.08 kg/m2     Patient appears generally well, mood and affect are appropriate and pleasant. HEENT:  Hearing intact, non-icteric, normocephalic, atraumatic. Neck Exam: Supple, No JVD.  + left neck bruits  Lung Exam: Clear to auscultation, even breath sounds. Cardiac Exam: Regular rate and rhythm with no murmur  Abdomen: Soft, non-tender, normal bowel sounds. No bruits or masses. Extremities: Moves all ext well. No lower extremity edema. Vascular - 1+ DP's  Psych: Appropriate affect  Neuro - Grossly intact        LABS / OTHER STUDIES:     Will get labs from PCP to review       CARDIAC DIAGNOSTICS:     Cardiac Evaluation Includes:    a. Cath (12/4/3): LAD p30, m40. RI 95. LCx small. RCA p99, m80; LVBr 40. EF 55%. b. PCI (12/4/3): RI 2.5x18 Cypher. pRCA 3.0x12 Vision, mRCA 2.5x23 Cypher.  c. Cath (9/7/6): LAD m30. LCx p40. RCA d40. EF 65%. No AVG/MR. DBonni Fat Cardiolite 11/17 - Normal MPI. LVEF 63%      a. ABIs (5/23/3): Right 0.66, Left 0.64.  b. Angio (9/7/6): Mild bilateral renal and iliac disease. Right SFA 85%, left SFA 80%. 3 vessel runoff bilaterally. c. PTA Right SFA (9/14/6): 6x120 Protege. d. PTA Left SFA (9/21/6): 6.0x120 Protege.   E. LE bypass surgery 11/17 (Dr. Tay Gee)       EKG 4/117/18 - NSR, non-specific T wave abn         ASSESSMENT AND PLAN:     Assessment and Plan:  1) CAD  - denies anginal complaints   - cont ASA, plavix, statin     2) PAD  - LE bypass surgery 11/17 (Dr. Kayden Mckay)   - Denies claudication   - She says she sees Vascular surgery for vascular issues (including carotids)     3) Preop - shoulder surgery (Dr. Carny Thompson)  - She can proceed with surgery and should have intermediate risk of cardiac complications  - would hold plavix 5 days prior to surgery and resume when safe  - would continue ASA 81 mg uninterrupted if possible     4) HTN   - BP OK   - cont meds    5) Dyslipidemia  - lipids followed by PCP     6) Smokes just 1 cigarette with cocktail rarely     7) See me back in one year. Patient expressed understanding of the plan - questions were answered. Born and raised in [facility]. Was a  for [employer]. Bob Zamudio MD, [address]  Ph: [phone]   Ph [phone]

## 2019-04-23 ENCOUNTER — OFFICE VISIT (OUTPATIENT)
Dept: CARDIOLOGY CLINIC | Age: 69
End: 2019-04-23

## 2019-04-23 VITALS
DIASTOLIC BLOOD PRESSURE: 68 MMHG | BODY MASS INDEX: 30 KG/M2 | HEART RATE: 91 BPM | WEIGHT: 163 LBS | HEIGHT: 62 IN | RESPIRATION RATE: 16 BRPM | SYSTOLIC BLOOD PRESSURE: 138 MMHG

## 2019-04-23 DIAGNOSIS — I25.10 CORONARY ARTERY DISEASE INVOLVING NATIVE CORONARY ARTERY OF NATIVE HEART WITHOUT ANGINA PECTORIS: Primary | ICD-10-CM

## 2019-04-23 DIAGNOSIS — I10 ESSENTIAL HYPERTENSION, BENIGN: ICD-10-CM

## 2019-04-23 DIAGNOSIS — I73.9 PERIPHERAL VASCULAR DISEASE (HCC): ICD-10-CM

## 2019-04-23 RX ORDER — LORATADINE 10 MG/1
10 TABLET ORAL
COMMUNITY
End: 2020-07-16

## 2019-04-23 NOTE — PROGRESS NOTES
Visit Vitals /68 (BP 1 Location: Left arm) Pulse 91 Resp 16 Ht 5' 2\" (1.575 m) Wt 163 lb (73.9 kg) BMI 29.81 kg/m² Patient is here for follow up. Denies chest pain, SOB and swelling.

## 2019-04-23 NOTE — PROGRESS NOTES
Chalo Magana MD. [facility] [place] Patient: Kita Wilson : 1950 Today's Date: 2019 HISTORY OF PRESENT ILLNESS:  
 
History of Present Illness: 
Here for follow-up. No cardiac complaints. Doing well. No CP or SOB. Has some hip / leg pain. Walks around [place]. PAST MEDICAL HISTORY:  
 
Past Medical History:  
Diagnosis Date  Coronary artery disease 2013  Diabetes mellitus ([address] 2013  Dyslipidemia 2013  Essential hypertension, benign 2013  Peripheral vascular disease 2013 MEDICATIONS:  
 
Current Outpatient Medications Medication Sig Dispense Refill  loratadine (CLARITIN) 10 mg tablet Take 10 mg by mouth.  rosuvastatin (CRESTOR) 20 mg tablet Take 20 mg by mouth nightly.  clopidogrel (PLAVIX) 75 mg tab Take  by mouth daily.  aspirin 81 mg tablet Take 81 mg by mouth daily.  losartan-hydrochlorothiazide (HYZAAR) 100-25 mg per tablet Take 1 Tab by mouth daily.  metFORMIN (GLUCOPHAGE) 500 mg tablet Take 500 mg by mouth two (2) times daily (with meals).  sitaGLIPtin (JANUVIA) 50 mg tablet Take 50 mg by mouth daily.  cholecalciferol, vitamin D3, 2,000 unit tab Take  by mouth daily.  cetirizine (ZYRTEC) 10 mg tablet Take 10 mg by mouth daily.  CALCIUM CARBONATE/VITAMIN D3 (CALCARB 600 WITH VITAMIN D PO) Take  by mouth. No Known Allergies SOCIAL HISTORY:  
 
Social History Tobacco Use  Smoking status: Current Some Day Smoker Types: Cigarettes  Smokeless tobacco: Never Used Substance Use Topics  Alcohol use: No  
  Alcohol/week: 0.0 oz  Drug use: No  
 
 
 
FAMILY HISTORY:  
 
Family History Problem Relation Age of Onset  Heart Attack Mother   
 
 
 
  
REVIEW OF SYMPTOMS:  
  
Review of Symptoms: 
Constitutional: Negative for fever, chills HEENT: Negative for nosebleeds, tinnitus, and vision changes. Respiratory: Negative for cough, wheezing Cardiovascular: Negative for orthopnea, syncope, and PND. Gastrointestinal: Negative for abdominal pain, diarrhea, melena. Genitourinary: Negative for dysuria Musculoskeletal: Negative for myalgias. Skin: Negative for rash Heme: No problems bleeding. Neurological: Negative for speech change and focal weakness.  
  
  
  
  
PHYSICAL EXAM:  
  
Physical Exam: 
Visit Vitals /68 (BP 1 Location: Left arm) Pulse 91 Resp 16 Ht 5' 2\" (1.575 m) Wt 163 lb (73.9 kg) BMI 29.81 kg/m² Patient appears generally well, mood and affect are appropriate and pleasant. HEENT:  Hearing intact, non-icteric, normocephalic, atraumatic. Neck Exam: Supple, No JVD.  + left neck bruits Lung Exam: Clear to auscultation, even breath sounds. Cardiac Exam: Regular rate and rhythm with no murmur Abdomen: Soft, non-tender, normal bowel sounds. No bruits or masses. Extremities: Moves all ext well. No lower extremity edema. Vascular - 1+ DP's Psych: Appropriate affect Neuro - Grossly intact 
  
  
  
LABS / OTHER STUDIES:  
  
Labs with PCP - requested copy  
  
  
CARDIAC DIAGNOSTICS:  
  
Cardiac Evaluation Includes: 
  
a. Cath (12/4/3): LAD p30, m40. RI 95. LCx small. RCA p99, m80; LVBr 40. EF 55%. b. PCI (12/4/3): RI 2.5x18 Cypher. pRCA 3.0x12 Vision, mRCA 2.5x23 Cypher. 
c. Cath (9/7/6): LAD m30. LCx p40. RCA d40. EF 65%. No AVG/MR. DCorinn [facility] Cardiolite 11/17 - Normal MPI. LVEF 63% 
  
  
a. ABIs (5/23/3): Right 0.66, Left 0.64. 
b. Angio (9/7/6): Mild bilateral renal and iliac disease. Right SFA 85%, left SFA 80%. 3 vessel runoff bilaterally. c. PTA Right SFA (9/14/6): 6x120 Protege. d. PTA Left SFA (9/21/6): 6.0x120 Protege.  
E. LE bypass surgery 11/17 (Dr. Loly Carpenter)  
  
  
EKG 4/117/18 - NSR, non-specific T wave abn  
EKG 4/23/19 - NSR, non-specific T wave abn  
  
  
  
ASSESSMENT AND PLAN:  
  
Assessment and Plan: 
1) CAD 
 - denies anginal complaints  
- cont ASA, plavix, statin 
  
2) PAD 
- LE bypass surgery 11/17 (Dr. Wayne Vargas) - Denies claudication - She says she sees Vascular - plavix was added by Vascular surgery   
3) HTN  
- BP OK  
- cont meds   
4) Dyslipidemia 
- cont statin  
- lipids followed by PCP  
  
5) Smokes just 1 cigarette with cocktail rarely   
6) See me back in one year. Patient expressed understanding of the plan - questions were answered. Born and raised in [place]. Was a  for the [address] Saige Hernandez MD, [facility] [address], [address]      [address] [facility] [address] Ph: [phone]                               Ph [phone]

## 2020-07-16 ENCOUNTER — OFFICE VISIT (OUTPATIENT)
Dept: CARDIOLOGY CLINIC | Age: 70
End: 2020-07-16

## 2020-07-16 VITALS
BODY MASS INDEX: 29.81 KG/M2 | WEIGHT: 162 LBS | OXYGEN SATURATION: 97 % | DIASTOLIC BLOOD PRESSURE: 76 MMHG | SYSTOLIC BLOOD PRESSURE: 156 MMHG | RESPIRATION RATE: 16 BRPM | HEIGHT: 62 IN | HEART RATE: 77 BPM

## 2020-07-16 DIAGNOSIS — I25.10 CORONARY ARTERY DISEASE INVOLVING NATIVE CORONARY ARTERY OF NATIVE HEART WITHOUT ANGINA PECTORIS: Primary | ICD-10-CM

## 2020-07-16 DIAGNOSIS — I10 ESSENTIAL HYPERTENSION, BENIGN: ICD-10-CM

## 2020-07-16 RX ORDER — ALBUTEROL SULFATE 90 UG/1
AEROSOL, METERED RESPIRATORY (INHALATION)
COMMUNITY
Start: 2020-04-08

## 2020-07-16 RX ORDER — NISOLDIPINE 17 MG/1
TABLET, FILM COATED, EXTENDED RELEASE ORAL
COMMUNITY
Start: 2020-05-11 | End: 2020-09-17 | Stop reason: SDUPTHER

## 2020-07-16 NOTE — PROGRESS NOTES
Identified pt with two pt identifiers(name and ). Reviewed record in preparation for visit and have obtained necessary documentation. Chief Complaint   Patient presents with    Annual Exam    Holter Monitor    Coronary Artery Disease    Hypertension     PVD        Health Maintenance Due   Topic    Hepatitis C Screening     Foot Exam Q1     A1C test (Diabetic or Prediabetic)     MICROALBUMIN Q1     Eye Exam Retinal or Dilated     Lipid Screen     DTaP/Tdap/Td series (1 - Tdap)    Shingrix Vaccine Age 50> (1 of 2)    Breast Cancer Screen Mammogram     FOBT Q1Y Age 54-65    [facility] GLAU[facility] SCREENING Q2Y     Bone Densitometry (Dexa) Screening     Pneumococcal 65+ years (1 of 1 - PPSV23)       Visit Vitals  /76 (BP 1 Location: Right arm, BP Patient Position: Sitting)   Pulse 77   Resp 16   Ht 5' 2\" (1.575 m)   Wt 162 lb (73.5 kg)   SpO2 97%   BMI 29.63 kg/m²         Coordination of Care Questionnaire:  :   1) Have you been to an emergency room, urgent care, or hospitalized since your last visit? If yes, where when, and reason for visit? NO       2. Have seen or consulted any other health care provider since your last visit? If yes, where when, and reason for visit? NO      Patient is accompanied by self I have received verbal consent from Abdirashid Danielle to discuss any/all medical information while they are present in the room.

## 2020-07-16 NOTE — PROGRESS NOTES
Nimisha Hernandez MD. [facility] [place]              Patient: Anai Sheikh  : 1950      Today's Date: 2020          HISTORY OF PRESENT ILLNESS:     History of Present Illness:  Doing well. No complaints. No CP or SOB. PAST MEDICAL HISTORY:     Past Medical History:   Diagnosis Date    Coronary artery disease 2013    Diabetes mellitus ([address] 2013    Dyslipidemia 2013    Essential hypertension, benign 2013    Peripheral vascular disease 2013           MEDICATIONS:     Current Outpatient Medications   Medication Sig Dispense Refill    nisoldipine SR (SULAR) 17 mg Tb24 tablet       albuterol (PROVENTIL HFA, VENTOLIN HFA, PROAIR HFA) 90 mcg/actuation inhaler       rosuvastatin (CRESTOR) 20 mg tablet Take 20 mg by mouth nightly.  clopidogrel (PLAVIX) 75 mg tab Take  by mouth daily.  cetirizine (ZYRTEC) 10 mg tablet Take 10 mg by mouth daily.  aspirin 81 mg tablet Take 81 mg by mouth daily.  losartan-hydrochlorothiazide (HYZAAR) 100-25 mg per tablet Take 1 Tab by mouth daily.  metFORMIN (GLUCOPHAGE) 500 mg tablet Take 500 mg by mouth two (2) times daily (with meals).  sitaGLIPtin (JANUVIA) 50 mg tablet Take 50 mg by mouth daily. No Known Allergies          SOCIAL HISTORY:     Social History     Tobacco Use    Smoking status: Current Some Day Smoker     Types: Cigarettes    Smokeless tobacco: Never Used   Substance Use Topics    Alcohol use: No     Alcohol/week: 0.0 standard drinks    Drug use: No         FAMILY HISTORY:     Family History   Problem Relation Age of Onset    Heart Attack Mother             REVIEW OF SYMPTOMS:      Review of Symptoms:  Constitutional: Negative for fever, chills  HEENT: Negative for nosebleeds, tinnitus, and vision changes. Respiratory: Negative for cough, wheezing  Cardiovascular: Negative for orthopnea, syncope, and PND. Gastrointestinal: Negative for abdominal pain, diarrhea, melena. Genitourinary: Negative for dysuria  Musculoskeletal: Negative for myalgias. Skin: Negative for rash  Heme: No problems bleeding. Neurological: Negative for speech change and focal weakness.               PHYSICAL EXAM:      Physical Exam:  Visit Vitals  /76 (BP 1 Location: Right arm, BP Patient Position: Sitting)   Pulse 77   Resp 16   Ht 5' 2\" (1.575 m)   Wt 162 lb (73.5 kg)   SpO2 97%   BMI 29.63 kg/m²          Patient appears generally well, mood and affect are appropriate and pleasant. HEENT:  Hearing intact, non-icteric, normocephalic, atraumatic. Neck Exam: Supple, No JVD.  + left neck bruits  Lung Exam: Clear to auscultation, even breath sounds. Cardiac Exam: Regular rate and rhythm with no murmur  Abdomen: Soft, non-tender, normal bowel sounds. No bruits or masses. Extremities: Moves all ext well. No lower extremity edema. Vascular - 1+ DP's  Psych: Appropriate affect  Neuro - Grossly intact           LABS / OTHER STUDIES:      Labs with PCP - requested copy           CARDIAC DIAGNOSTICS:      Cardiac Evaluation Includes:     a. Cath (12/4/3): LAD p30, m40. RI 95. LCx small. RCA p99, m80; LVBr 40. EF 55%. b. PCI (12/4/3): RI 2.5x18 Cypher. pRCA 3.0x12 Vision, mRCA 2.5x23 Cypher.  c. Cath (9/7/6): LAD m30. LCx p40. RCA d40. EF 65%. No AVG/MR. DVeneda Elder Cardiolite 11/17 - Normal MPI.  LVEF 63%        a. ABIs (5/23/3): Right 0.66, Left 0.64.  b. Angio (9/7/6): Mild bilateral renal and iliac disease. Right SFA 85%, left SFA 80%. 3 vessel runoff bilaterally. c. PTA Right SFA (9/14/6): 6x120 Protege. d. PTA Left SFA (9/21/6): 6.0x120 Protege.   E. LE bypass surgery 11/17 (Dr. Wadie Cranker        EKG 4/117/18 - NSR, non-specific T wave abn   EKG 4/23/19 - NSR, non-specific T wave abn   EKG 7/16/20 - NSR, non-specific T wave abn            ASSESSMENT AND PLAN:      Assessment and Plan:  1) CAD  - denies anginal complaints   - cont plavix, statin  - can stop aspirin and take plavix alone      2) LE PAD and carotid bruits    - LE bypass surgery 11/17 (Dr. Charles Daley)   - Denies claudication   - She says she sees Vascular   - plavix was added by Vascular surgery   - she is to have carotid dopplers checked with Vascular surgery soon      3) HTN   - BP up and down - but she says her home BP was OK - PCP decreased CCB recently   - Will have patient follow BP at home and adjust meds   - cont current meds     4) Dyslipidemia  - cont statin   - lipids followed by PCP      5) Smokes just 1 cigarette with cocktail rarely      6) See me in 2 months.  Patient expressed understanding of the plan - questions were answered.      Born and raised in [address] a  for [employer].          Ronn Galvan MD, [address] [place]  [address] [place] [address], [address]      [id number]  [address]  [facility], [address] [place] [address]  Ph: [phone]                                [phone]

## 2020-09-17 ENCOUNTER — OFFICE VISIT (OUTPATIENT)
Dept: CARDIOLOGY CLINIC | Age: 70
End: 2020-09-17
Payer: COMMERCIAL

## 2020-09-17 VITALS
HEART RATE: 85 BPM | BODY MASS INDEX: 30.18 KG/M2 | OXYGEN SATURATION: 96 % | DIASTOLIC BLOOD PRESSURE: 68 MMHG | HEIGHT: 62 IN | SYSTOLIC BLOOD PRESSURE: 136 MMHG | WEIGHT: 164 LBS

## 2020-09-17 DIAGNOSIS — E78.5 DYSLIPIDEMIA: ICD-10-CM

## 2020-09-17 DIAGNOSIS — I25.118 CORONARY ARTERY DISEASE OF NATIVE ARTERY OF NATIVE HEART WITH STABLE ANGINA PECTORIS (HCC): Primary | ICD-10-CM

## 2020-09-17 DIAGNOSIS — I73.9 PERIPHERAL VASCULAR DISEASE (HCC): ICD-10-CM

## 2020-09-17 PROCEDURE — G8754 DIAS BP LESS 90: HCPCS | Performed by: SPECIALIST

## 2020-09-17 PROCEDURE — G8536 NO DOC ELDER MAL SCRN: HCPCS | Performed by: SPECIALIST

## 2020-09-17 PROCEDURE — G8400 PT W/DXA NO RESULTS DOC: HCPCS | Performed by: SPECIALIST

## 2020-09-17 PROCEDURE — G8432 DEP SCR NOT DOC, RNG: HCPCS | Performed by: SPECIALIST

## 2020-09-17 PROCEDURE — G8427 DOCREV CUR MEDS BY ELIG CLIN: HCPCS | Performed by: SPECIALIST

## 2020-09-17 PROCEDURE — 1090F PRES/ABSN URINE INCON ASSESS: CPT | Performed by: SPECIALIST

## 2020-09-17 PROCEDURE — 99214 OFFICE O/P EST MOD 30 MIN: CPT | Performed by: SPECIALIST

## 2020-09-17 PROCEDURE — 1101F PT FALLS ASSESS-DOCD LE1/YR: CPT | Performed by: SPECIALIST

## 2020-09-17 PROCEDURE — G8752 SYS BP LESS 140: HCPCS | Performed by: SPECIALIST

## 2020-09-17 PROCEDURE — 3017F COLORECTAL CA SCREEN DOC REV: CPT | Performed by: SPECIALIST

## 2020-09-17 PROCEDURE — G8419 CALC BMI OUT NRM PARAM NOF/U: HCPCS | Performed by: SPECIALIST

## 2020-09-17 PROCEDURE — G0463 HOSPITAL OUTPT CLINIC VISIT: HCPCS | Performed by: SPECIALIST

## 2020-09-17 RX ORDER — NISOLDIPINE 25.5 MG/1
25.5 TABLET, FILM COATED, EXTENDED RELEASE ORAL DAILY
Qty: 90 TAB | Refills: 3 | Status: SHIPPED | OUTPATIENT
Start: 2020-09-17 | End: 2020-09-23 | Stop reason: ALTCHOICE

## 2020-09-17 NOTE — PROGRESS NOTES
Chief Complaint   Patient presents with    Follow-up     2 months    Coronary Artery Disease    Hypertension     Visit Vitals  /68 (BP 1 Location: Left arm, BP Patient Position: Sitting)   Pulse 85   Ht 5' 2\" (1.575 m)   Wt 164 lb (74.4 kg)   SpO2 96%   BMI 30.00 kg/m²     Chest pain denied   SOB denied   Swelling in hands/feet denied   Dizziness denied   Recent hospital stays denied   Refills denied

## 2020-09-17 NOTE — PROGRESS NOTES
Jerod Samano MD. [facility] [place]              Patient: Leslie Davey  : 1950      Today's Date: 2020          HISTORY OF PRESENT ILLNESS:     History of Present Illness:  She is feeling well. -140/60-70 at home. No CP or SOB. No complaints. PAST MEDICAL HISTORY:     Past Medical History:   Diagnosis Date    Coronary artery disease 2013    Diabetes mellitus ([address] 2013    Dyslipidemia 2013    Essential hypertension, benign 2013    Peripheral vascular disease 2013           MEDICATIONS:     Current Outpatient Medications   Medication Sig Dispense Refill    nisoldipine SR (SULAR) 17 mg Tb24 tablet       albuterol (PROVENTIL HFA, VENTOLIN HFA, PROAIR HFA) 90 mcg/actuation inhaler       rosuvastatin (CRESTOR) 20 mg tablet Take 20 mg by mouth nightly.  clopidogrel (PLAVIX) 75 mg tab Take  by mouth daily.  cetirizine (ZYRTEC) 10 mg tablet Take 10 mg by mouth daily.  losartan-hydrochlorothiazide (HYZAAR) 100-25 mg per tablet Take 1 Tab by mouth daily.  metFORMIN (GLUCOPHAGE) 500 mg tablet Take 500 mg by mouth two (2) times daily (with meals).  sitaGLIPtin (JANUVIA) 50 mg tablet Take 50 mg by mouth daily. No Known Allergies        SOCIAL HISTORY:     Social History     Tobacco Use    Smoking status: Current Some Day Smoker     Types: Cigarettes    Smokeless tobacco: Never Used   Substance Use Topics    Alcohol use: No     Alcohol/week: 0.0 standard drinks    Drug use: No         FAMILY HISTORY:     Family History   Problem Relation Age of Onset    Heart Attack Mother             REVIEW OF SYMPTOMS:      Review of Symptoms:  Constitutional: Negative for fever, chills  HEENT: Negative for nosebleeds, tinnitus, and vision changes. Respiratory: Negative for cough, wheezing  Cardiovascular: Negative for orthopnea, syncope, and PND. Gastrointestinal: Negative for abdominal pain, diarrhea, melena.    Genitourinary: Negative for dysuria  Musculoskeletal: Negative for myalgias. Skin: Negative for rash  Heme: No problems bleeding. Neurological: Negative for speech change and focal weakness.               PHYSICAL EXAM:      Physical Exam:  Visit Vitals  /68 (BP 1 Location: Left arm, BP Patient Position: Sitting)   Pulse 85   Ht 5' 2\" (1.575 m)   Wt 164 lb (74.4 kg)   SpO2 96%   BMI 30.00 kg/m²          Patient appears generally well, mood and affect are appropriate and pleasant. HEENT:  Hearing intact, non-icteric, normocephalic, atraumatic. Neck Exam: Supple, No JVD.  + left neck bruits  Lung Exam: Clear to auscultation, even breath sounds. Cardiac Exam: Regular rate and rhythm with no murmur  Abdomen: Soft, non-tender, normal bowel sounds. No bruits or masses. Extremities: Moves all ext well. No lower extremity edema. Vascular - 1+ DP's  Psych: Appropriate affect  Neuro - Grossly intact           LABS / OTHER STUDIES:      Labs with PCP - requested copy            CARDIAC DIAGNOSTICS:      Cardiac Evaluation Includes:     a. Cath (12/4/3): LAD p30, m40. RI 95. LCx small. RCA p99, m80; LVBr 40. EF 55%. b. PCI (12/4/3): RI 2.5x18 Cypher. pRCA 3.0x12 Vision, mRCA 2.5x23 Cypher.  c. Cath (9/7/6): LAD m30. LCx p40. RCA d40. EF 65%. No AVG/MR. Al Contreras Cardiolite 11/17 - Normal MPI.  LVEF 63%        a. ABIs (5/23/3): Right 0.66, Left 0.64.  b. Angio (9/7/6): Mild bilateral renal and iliac disease. Right SFA 85%, left SFA 80%. 3 vessel runoff bilaterally. c. PTA Right SFA (9/14/6): 6x120 Protege. d. PTA Left SFA (9/21/6): 6.0x120 Protege.   E. LE bypass surgery 11/17 (Dr. Zoe Mercado        EKG 4/117/18 - NSR, non-specific T wave abn   EKG 4/23/19 - NSR, non-specific T wave abn   EKG 7/16/20 - NSR, non-specific T wave abn             ASSESSMENT AND PLAN:      Assessment and Plan:  1) CAD  - denies anginal complaints   - cont plavix, statin     2) LE PAD and carotid bruits    - LE bypass surgery 11/17 (Dr. Michael Garza) - Denies claudication   - She says she sees Vascular   - plavix was added by Vascular surgery   - carotid dopplers followed with Vascular surgery      3) HTN   - BP mildly high - -140 --> will increase Nisoldipine to 25 mg daily   - cont current meds     4) Dyslipidemia  - cont statin   - lipids followed by PCP      5) Smokes just 1 cigarette with cocktail rarely      6) See me in 12 months.  Patient expressed understanding of the plan - questions were answered.      Born [place] raised in [address] a  for [employer].          Serg Marie MD, [address], [address] [id number]  [address]  [facility], [address] [place] [address]  Ph: [phone]                                [phone]

## 2020-09-22 ENCOUNTER — TELEPHONE (OUTPATIENT)
Dept: CARDIOLOGY CLINIC | Age: 70
End: 2020-09-22

## 2020-09-22 NOTE — TELEPHONE ENCOUNTER
Fax received from [employer]: 
Nisoldipine tab 25.5 ER is mfr backordered and brand SULAR tab 25.5. mg is mfr discontinued. Please provide a new rx.

## 2020-09-22 NOTE — TELEPHONE ENCOUNTER
[facility] pharmacy is calling as the patient was ordered nisoldipine and that medication is currently on backorder. Please advise. [facility] Phone: [phone] Reference number: [id number] Thanks

## 2020-09-23 RX ORDER — AMLODIPINE BESYLATE 5 MG/1
5 TABLET ORAL DAILY
Qty: 90 TAB | Refills: 0 | Status: SHIPPED | OUTPATIENT
Start: 2020-09-23 | End: 2020-12-14 | Stop reason: SDUPTHER

## 2020-09-23 NOTE — TELEPHONE ENCOUNTER
LVM 
Per Dr. Madalyn Samuel: Gwendolynn Route over to Norvasc 5 mg daily. If after 2 weeks SBP > 140 mmHg, then have her let us know and we'll increase Norvasc to 10 mg daily.  \"

## 2020-11-11 ENCOUNTER — TELEPHONE (OUTPATIENT)
Dept: CARDIOLOGY CLINIC | Age: 70
End: 2020-11-11

## 2020-11-11 NOTE — TELEPHONE ENCOUNTER
Patient calling to speak to nurse in regards to medication changes and how it's making patient feel. Please advise.     Best # [phone]

## 2020-11-11 NOTE — TELEPHONE ENCOUNTER
Spoke to pt, x2ID  Taking losartan-HCTZ 100-25 daily and Amlodipine 5 mg    140 + SBP  Friday 134/79,   Saturday 166/82  Sun-Tue 147 SBP, 135/84  Wed 141/77, 126/86    Denies swelling in ankles. Pt states \"I feel great, but numbers don't look good. \"    C/b [phone]

## 2020-11-12 NOTE — TELEPHONE ENCOUNTER
Spoke to pt,  She actually took 2 tablets of her amlodipine 5 mg this morning. I let her know that this is Dr. Beryle Rapidan recommendation also. Per Dr. Vasquez Dears: \"Let's increase norvasc to 10 mg daily. \"  BP this am was 146/74, then this afternoon was 125/60. Asked pt to keep BP readings for the next two weeks and let us know how things are going. Pt agreeable.

## 2020-12-14 RX ORDER — AMLODIPINE BESYLATE 10 MG/1
10 TABLET ORAL DAILY
Qty: 90 TAB | Refills: 2 | Status: SHIPPED | OUTPATIENT
Start: 2020-12-14 | End: 2020-12-30 | Stop reason: SDUPTHER

## 2020-12-14 NOTE — TELEPHONE ENCOUNTER
Refill per VO of Dr. Childs Ar: 
Last appt: 9/17/2020 Future Appointments Date Time Provider Rene Zavala 7/20/2021 10:40 AM Therese Stephen MD [facility] Requested Prescriptions Signed Prescriptions Disp Refills  amLODIPine (NORVASC) 10 mg tablet 90 Tab 2 Sig: Take 1 Tab by mouth daily. Authorizing Provider: Katy Andrews   Ordering User: Rocco Malloy

## 2020-12-30 ENCOUNTER — TELEPHONE (OUTPATIENT)
Dept: CARDIOLOGY CLINIC | Age: 70
End: 2020-12-30

## 2020-12-30 RX ORDER — AMLODIPINE BESYLATE 10 MG/1
10 TABLET ORAL DAILY
Qty: 30 TAB | Refills: 0 | Status: SHIPPED | OUTPATIENT
Start: 2020-12-30 | End: 2021-01-22

## 2020-12-30 NOTE — TELEPHONE ENCOUNTER
Refill sent in on 12/14. Called [facility]. Shipped on 12/15. In transit. Should have been delivered. May have been lost in transit. Transferred to customer service to get a new order placed. Shipped next day. Called pt, She requested a short term supply in town. Sent to [facility].

## 2020-12-30 NOTE — TELEPHONE ENCOUNTER
Patient calling in regards to amlopidipine. States a request was put in 12/17 and still has not received it an [facility] mail order told her she needed a new prescription. Patient states she is out of medication. Phone: [phone]

## 2021-01-22 RX ORDER — AMLODIPINE BESYLATE 10 MG/1
TABLET ORAL
Qty: 90 TAB | Refills: 2 | Status: SHIPPED | OUTPATIENT
Start: 2021-01-22 | End: 2021-12-06 | Stop reason: SDUPTHER

## 2021-01-22 NOTE — TELEPHONE ENCOUNTER
Refill per VO of Dr. Jae Bai: 
Last appt: 9/17/2020 Future Appointments Date Time Provider Rene Sally 7/20/2021 10:40 AM Kelly Lucio MD [facility] Requested Prescriptions Signed Prescriptions Disp Refills  amLODIPine (NORVASC) 10 mg tablet 90 Tab 2 Sig: TAKE 1 TABLET BY MOUTH EVERY DAY Authorizing Provider: Joe Obrien   Ordering User: Amish Nath

## 2021-08-06 ENCOUNTER — OFFICE VISIT (OUTPATIENT)
Dept: CARDIOLOGY CLINIC | Age: 71
End: 2021-08-06
Payer: COMMERCIAL

## 2021-08-06 VITALS
DIASTOLIC BLOOD PRESSURE: 62 MMHG | HEIGHT: 62 IN | SYSTOLIC BLOOD PRESSURE: 116 MMHG | WEIGHT: 147 LBS | HEART RATE: 80 BPM | BODY MASS INDEX: 27.05 KG/M2

## 2021-08-06 DIAGNOSIS — I10 ESSENTIAL HYPERTENSION, BENIGN: ICD-10-CM

## 2021-08-06 DIAGNOSIS — I25.118 CORONARY ARTERY DISEASE OF NATIVE ARTERY OF NATIVE HEART WITH STABLE ANGINA PECTORIS (HCC): Primary | ICD-10-CM

## 2021-08-06 PROCEDURE — 99214 OFFICE O/P EST MOD 30 MIN: CPT | Performed by: SPECIALIST

## 2021-08-06 PROCEDURE — 93000 ELECTROCARDIOGRAM COMPLETE: CPT | Performed by: SPECIALIST

## 2021-08-06 RX ORDER — DULAGLUTIDE 1.5 MG/.5ML
1.5 INJECTION, SOLUTION SUBCUTANEOUS
COMMUNITY

## 2021-08-06 RX ORDER — KETOCONAZOLE 20 MG/ML
SHAMPOO TOPICAL AS NEEDED
COMMUNITY
Start: 2021-06-07

## 2021-08-06 NOTE — PROGRESS NOTES
Room 7    Preparing for hysterectomy -Aug. 17 ([facility])  Colonoscopy Aug 30 ([place])    Visit Vitals  /62 (BP 1 Location: Left upper arm, BP Patient Position: Sitting, BP Cuff Size: Adult)   Pulse 80   Ht 5' 2\" (1.575 m)   Wt 147 lb (66.7 kg)   BMI 26.89 kg/m²       Chest pain: no  Shortness of breath: no  Edema: no  Palpitations: no  Dizziness: no    New diagnosis/Surgeries: Uterus dropp(ing)    ER/Hospitalizations: no    Refills: no

## 2021-08-06 NOTE — PROGRESS NOTES
Augie Patterson MD. [facility] [place]              Patient: Donnie Jensen  : 1950      Today's Date: 2021          HISTORY OF PRESENT ILLNESS:     History of Present Illness:  Here for yearly FU. She is feeling well. Feels great. BP OK at home. No CP or SOB. No complaints. Plans for hysterectomy Chris Bolds) and a colonoscopy. PAST MEDICAL HISTORY:     Past Medical History:   Diagnosis Date    Coronary artery disease 2013    Diabetes mellitus ([address] 2013    Dyslipidemia 2013    Essential hypertension, benign 2013    Peripheral vascular disease 2013           MEDICATIONS:     Current Outpatient Medications   Medication Sig Dispense Refill    dulaglutide (Trulicity) 1.5 CG/9.1 mL sub-q pen 1.5 mg by SubCUTAneous route every seven (7) days.  ketoconazole (NIZORAL) 2 % shampoo Apply  to affected area as needed.  amLODIPine (NORVASC) 10 mg tablet TAKE 1 TABLET BY MOUTH EVERY DAY 90 Tab 2    rosuvastatin (CRESTOR) 20 mg tablet Take 20 mg by mouth nightly.  clopidogrel (PLAVIX) 75 mg tab Take  by mouth daily.  cetirizine (ZYRTEC) 10 mg tablet Take 10 mg by mouth daily.  losartan-hydrochlorothiazide (HYZAAR) 100-25 mg per tablet Take 1 Tab by mouth daily.  metFORMIN (GLUCOPHAGE) 500 mg tablet Take 500 mg by mouth two (2) times daily (with meals).  albuterol (PROVENTIL HFA, VENTOLIN HFA, PROAIR HFA) 90 mcg/actuation inhaler       sitaGLIPtin (JANUVIA) 50 mg tablet Take 50 mg by mouth daily. No Known Allergies        SOCIAL HISTORY:     Social History     Tobacco Use    Smoking status: Current Every Day Smoker     Types: Cigarettes    Smokeless tobacco: Never Used    Tobacco comment: 2-6 cigg per day   Substance Use Topics    Alcohol use:  Yes     Alcohol/week: 0.0 standard drinks     Comment: rare    Drug use: No         FAMILY HISTORY:     Family History   Problem Relation Age of Onset    Heart Attack Mother    REVIEW OF SYMPTOMS:      Review of Symptoms:  Constitutional: Negative for fever, chills  HEENT: Negative for nosebleeds, tinnitus, and vision changes. Respiratory: Negative for cough, wheezing  Cardiovascular: Negative for orthopnea, syncope, and PND. Gastrointestinal: Negative for abdominal pain, diarrhea, melena. Genitourinary: Negative for dysuria  Musculoskeletal: Negative for myalgias. Skin: Negative for rash  Heme: No problems bleeding. Neurological: Negative for speech change and focal weakness.               PHYSICAL EXAM:      Physical Exam:  Visit Vitals  /62 (BP 1 Location: Left upper arm, BP Patient Position: Sitting, BP Cuff Size: Adult)   Pulse 80   Ht 5' 2\" (1.575 m)   Wt 147 lb (66.7 kg)   BMI 26.89 kg/m²          Patient appears generally well, mood and affect are appropriate and pleasant. HEENT:  Hearing intact, non-icteric, normocephalic, atraumatic. Neck Exam: Supple, No JVD.  + left neck bruits  Lung Exam: Clear to auscultation, even breath sounds. Cardiac Exam: Regular rate and rhythm with no murmur  Abdomen: Soft, non-tender,   Extremities: Moves all ext well. No lower extremity edema. Psych: Appropriate affect  Neuro - Grossly intact           LABS / OTHER STUDIES:      Labs 6/20 - CBC and BMP OK -- followed by PCP            CARDIAC DIAGNOSTICS:      Cardiac Evaluation Includes:     a. Cath (12/4/3): LAD p30, m40. RI 95. LCx small. RCA p99, m80; LVBr 40. EF 55%. b. PCI (12/4/3): RI 2.5x18 Cypher. pRCA 3.0x12 Vision, mRCA 2.5x23 Cypher.  c. Cath (9/7/6): LAD m30. LCx p40. RCA d40. EF 65%. No AVG/MR. DPennelope Kub Cardiolite 11/17 - Normal MPI.  LVEF 63%        a. ABIs (5/23/3): Right 0.66, Left 0.64.  b. Angio (9/7/6): Mild bilateral renal and iliac disease. Right SFA 85%, left SFA 80%. 3 vessel runoff bilaterally. c. PTA Right SFA (9/14/6): 6x120 Protege. d. PTA Left SFA (9/21/6): 6.0x120 Guilherme ESTVEEZ bypass surgery 11/17 (Dr. Luna Corners        EKG 4/117/18 - NSR, non-specific T wave abn   EKG 4/23/19 - NSR, non-specific T wave abn   EKG 7/16/20 - NSR, non-specific T wave abn     EKG 8/6/21 - NSR, non-specific T wave abn              ASSESSMENT AND PLAN:      Assessment and Plan:  1) Preop Evaluation   - she can proceed with Gyn surgery with Dr. Kristyn Fontanez and should have intermediate risk   - She can proceed with a colonoscopy and should have low risk   - She can hold plavix 5 days prior to procedures and resume when safe     2) CAD  - denies anginal complaints   - cont plavix, statin, CCB, ARB     3) LE PAD and carotid bruits    - LE bypass surgery 11/17 (Dr. Talha Aponte)   - Denies claudication   - She says she sees Vascular   - plavix was added by Vascular surgery   - carotid dopplers followed with Vascular surgery      4) HTN   - BP OK   - cont current meds     5) Dyslipidemia  - cont statin   - lipids followed by PCP      6) Smokes just 1 cigarette with cocktail      7) See me in 12 months.  Patient expressed understanding of the plan - questions were answered.      Born [place] in [address] a  for [employer].          Shaila Garcia MD, [address], [address]      [id number] [address]  [address]  [facility], [address] [place] [address]  Ph: [phone]                                [phone]

## 2021-08-11 ENCOUNTER — TELEPHONE (OUTPATIENT)
Dept: CARDIOLOGY CLINIC | Age: 71
End: 2021-08-11

## 2021-08-11 NOTE — TELEPHONE ENCOUNTER
Oracio Pro is requesting the cardiac clearance to be [facility] to [facility] in regards to the mutual patient.     FAX: [phone]    PHONE: [phone]

## 2021-12-06 RX ORDER — AMLODIPINE BESYLATE 10 MG/1
10 TABLET ORAL DAILY
Qty: 90 TABLET | Refills: 2 | Status: SHIPPED | OUTPATIENT
Start: 2021-12-06 | End: 2022-09-16 | Stop reason: SDUPTHER

## 2021-12-06 NOTE — TELEPHONE ENCOUNTER
Patient requesting a refill on amlodipine 10 mg,       [facility] [phone]

## 2022-05-09 ENCOUNTER — TELEPHONE (OUTPATIENT)
Dept: CARDIOLOGY CLINIC | Age: 72
End: 2022-05-09

## 2022-05-09 NOTE — TELEPHONE ENCOUNTER
Calling to inquire whether the patient is able to discontinue her clopidogrel (PLAVIX) 75 mg tab for five days for the procedure schedule on 05/16/2022        PHONE:[phone]

## 2022-08-09 ENCOUNTER — OFFICE VISIT (OUTPATIENT)
Dept: CARDIOLOGY CLINIC | Age: 72
End: 2022-08-09
Payer: COMMERCIAL

## 2022-08-09 VITALS
HEART RATE: 85 BPM | HEIGHT: 62 IN | SYSTOLIC BLOOD PRESSURE: 132 MMHG | DIASTOLIC BLOOD PRESSURE: 70 MMHG | OXYGEN SATURATION: 98 % | WEIGHT: 151 LBS | BODY MASS INDEX: 27.79 KG/M2

## 2022-08-09 DIAGNOSIS — E78.5 DYSLIPIDEMIA: ICD-10-CM

## 2022-08-09 DIAGNOSIS — I25.118 CORONARY ARTERY DISEASE OF NATIVE ARTERY OF NATIVE HEART WITH STABLE ANGINA PECTORIS (HCC): Primary | ICD-10-CM

## 2022-08-09 DIAGNOSIS — I10 ESSENTIAL HYPERTENSION, BENIGN: ICD-10-CM

## 2022-08-09 PROCEDURE — 93000 ELECTROCARDIOGRAM COMPLETE: CPT | Performed by: SPECIALIST

## 2022-08-09 PROCEDURE — 1123F ACP DISCUSS/DSCN MKR DOCD: CPT | Performed by: SPECIALIST

## 2022-08-09 PROCEDURE — 99214 OFFICE O/P EST MOD 30 MIN: CPT | Performed by: SPECIALIST

## 2022-08-09 NOTE — PROGRESS NOTES
Chief Complaint   Patient presents with    Follow-up     Annual     Coronary Artery Disease    Cholesterol Problem    Hypertension     Visit Vitals  /70 (BP 1 Location: Left upper arm, BP Patient Position: Sitting)   Pulse 85   Ht 5' 2\" (1.575 m)   Wt 151 lb (68.5 kg)   SpO2 98%   BMI 27.62 kg/m²     Chest pain denied   SOB denied   Palpitations denied   Swelling in hands/feet denied   Dizziness denied   Recent hospital stays denied   Refills denied

## 2022-08-09 NOTE — PROGRESS NOTES
Alex Francis MD. [facility] [place]              Patient: Arnold Lugo  : 1950      Today's Date: 2022          HISTORY OF PRESENT ILLNESS:     History of Present Illness:  Here for yearly FU. She is feeling well. Feels great. BP OK at home. No CP or sig SOB. No complaints. Pelvic pain limits walking. PAST MEDICAL HISTORY:     Past Medical History:   Diagnosis Date    Coronary artery disease 2013    Diabetes mellitus ([address] 2013    Dyslipidemia 2013    Essential hypertension, benign 2013    Peripheral vascular disease 2013             CURRENT MEDICATIONS:      Current Outpatient Medications   Medication Sig Dispense Refill    amLODIPine (NORVASC) 10 mg tablet Take 1 Tablet by mouth daily. 90 Tablet 2    dulaglutide (Trulicity) 1.5 WD/3.2 mL sub-q pen 1.5 mg by SubCUTAneous route every seven (7) days. ketoconazole (NIZORAL) 2 % shampoo Apply  to affected area as needed. albuterol (PROVENTIL HFA, VENTOLIN HFA, PROAIR HFA) 90 mcg/actuation inhaler       rosuvastatin (CRESTOR) 20 mg tablet Take 20 mg by mouth nightly. clopidogrel (PLAVIX) 75 mg tab Take  by mouth daily. cetirizine (ZYRTEC) 10 mg tablet Take 10 mg by mouth daily. losartan-hydrochlorothiazide (HYZAAR) 100-25 mg per tablet Take 1 Tab by mouth daily. metFORMIN (GLUCOPHAGE) 500 mg tablet Take 500 mg by mouth two (2) times daily (with meals). No Known Allergies          SOCIAL HISTORY:     Social History     Tobacco Use    Smoking status: Every Day     Types: Cigarettes    Smokeless tobacco: Never    Tobacco comments:     2-6 cigg per day   Substance Use Topics    Alcohol use:  Yes     Alcohol/week: 0.0 standard drinks     Comment: rare    Drug use: No           FAMILY HISTORY:     Family History   Problem Relation Age of Onset    Heart Attack Mother              REVIEW OF SYMPTOMS:     Review of Symptoms:  Constitutional: Negative for fever, chills  HEENT: Negative for nosebleeds, tinnitus, and vision changes. Respiratory: Negative for cough, wheezing  Cardiovascular: Negative for orthopnea, claudication, syncope, and PND. Gastrointestinal: Negative for abdominal pain, diarrhea, melena. Genitourinary: Negative for dysuria  Musculoskeletal: Negative for myalgias. Skin: Negative for rash  Heme: No problems bleeding. Neurological: Negative for speech change and focal weakness. REVIEW OF SYMPTOMS:      Review of Symptoms:  Constitutional: Negative for fever, chills  HEENT: Negative for nosebleeds, tinnitus, and vision changes. Respiratory: Negative for cough, wheezing  Cardiovascular: Negative for orthopnea, syncope, and PND. Gastrointestinal: Negative for abdominal pain, diarrhea, melena. Genitourinary: Negative for dysuria  Musculoskeletal: Negative for myalgias. Skin: Negative for rash  Heme: No problems bleeding. Neurological: Negative for speech change and focal weakness. PHYSICAL EXAM:     Physical Exam:  Visit Vitals  /70 (BP 1 Location: Left upper arm, BP Patient Position: Sitting)   Pulse 85   Ht 5' 2\" (1.575 m)   Wt 151 lb (68.5 kg)   SpO2 98%   BMI 27.62 kg/m²     Patient appears generally well, mood and affect are appropriate and pleasant. HEENT:  Hearing intact, non-icteric, normocephalic, atraumatic. Neck Exam: Supple, No JVD. _ left neck bruit   Lung Exam: Clear to auscultation, even breath sounds. Cardiac Exam: Regular rate and rhythm with 4-4/6 systolic murmur  Abdomen: Soft, non-tender, normal bowel sounds. No bruits or masses. Extremities: Moves all ext well. No lower extremity edema. MSKTL: Overall good ROM ext  Skin: No significant rashes  Psych: Appropriate affect  Neuro - Grossly intact          LABS / OTHER STUDIES:      Chol 2/22 looks good - chol 97, LDL 51  Labs 6/22 - BMP OK            CARDIAC DIAGNOSTICS:      Cardiac Evaluation Includes:     a. Cath (12/4/3): LAD p30, m40. RI 95. LCx small.  RCA p99, m80; LVBr 40. EF 55%. b. PCI (12/4/3): RI 2.5x18 Cypher. pRCA 3.0x12 Vision, mRCA 2.5x23 Cypher.  c. Cath (9/7/6): LAD m30. LCx p40. RCA d40. EF 65%. No AVG/MR. DEudelia La Cardiolite 11/17 - Normal MPI. LVEF 63%        a. ABIs (5/23/3): Right 0.66, Left 0.64.  b. Angio (9/7/6): Mild bilateral renal and iliac disease. Right SFA 85%, left SFA 80%. 3 vessel runoff bilaterally. c. PTA Right SFA (9/14/6): 6x120 Protege. d. PTA Left SFA (9/21/6): 6.0x120 Protege. E. LE bypass surgery 11/17 (Dr. Rodrick Chahal)         EKG 4/117/18 - NSR, non-specific T wave abn   EKG 4/23/19 - NSR, non-specific T wave abn   EKG 7/16/20 - NSR, non-specific T wave abn    EKG 8/6/21 - NSR, non-specific T wave abn               ASSESSMENT AND PLAN:      Assessment and Plan:  1)  CAD  - denies anginal complaints  - cont plavix, statin, CCB, ARB     2) LE PAD and carotid bruits    - LE bypass surgery 11/17 (Dr. Rodrick Chahal)  - Denies claudication  - She says she sees Vascular for routine FU (Dr. Fidencio Ta)   - plavix was added by Vascular surgery   - carotid dopplers followed with Vascular surgery      3) HTN   - BP OK   - cont current meds     4) Dyslipidemia  - cont statin   - lipids followed by PCP (LDL 50's in 2/22)      5) Smokes just 1 cigarette with cocktail     6) See me in 12 months. Born and raised in [place]. Was a  for [employer]. Familia Banda MD, [address]      [address]  [address]  [place], [address] [place] [address], [address] [place] [address]  Ph: [phone]                               Ph [phone]

## 2022-09-16 RX ORDER — AMLODIPINE BESYLATE 10 MG/1
10 TABLET ORAL DAILY
Qty: 90 TABLET | Refills: 3 | Status: SHIPPED | OUTPATIENT
Start: 2022-09-16

## 2022-09-16 NOTE — TELEPHONE ENCOUNTER
Refill per VO of Dr. Shantel Shultz  Last appt: 8/6/2021  Future Appointments   Date Time Provider Rene Zavala   8/15/2023 10:00 AM MD JOSÉ MIGUEL Lugo AMB       Requested Prescriptions     Signed Prescriptions Disp Refills    amLODIPine (NORVASC) 10 mg tablet 90 Tablet 3     Sig: Take 1 Tablet by mouth daily.      Authorizing Provider: Dereje Nowak     Ordering User: Haydee Tilley

## 2023-05-21 RX ORDER — KETOCONAZOLE 20 MG/ML
SHAMPOO TOPICAL PRN
COMMUNITY
Start: 2021-06-07

## 2023-05-21 RX ORDER — LOSARTAN POTASSIUM AND HYDROCHLOROTHIAZIDE 25; 100 MG/1; MG/1
1 TABLET ORAL DAILY
COMMUNITY

## 2023-05-21 RX ORDER — ROSUVASTATIN CALCIUM 20 MG/1
20 TABLET, COATED ORAL NIGHTLY
COMMUNITY

## 2023-05-21 RX ORDER — ALBUTEROL SULFATE 90 UG/1
AEROSOL, METERED RESPIRATORY (INHALATION)
COMMUNITY
Start: 2020-04-08

## 2023-05-21 RX ORDER — CETIRIZINE HYDROCHLORIDE 10 MG/1
10 TABLET ORAL DAILY
COMMUNITY

## 2023-05-21 RX ORDER — AMLODIPINE BESYLATE 10 MG/1
10 TABLET ORAL DAILY
COMMUNITY
Start: 2022-09-16

## 2023-05-21 RX ORDER — DULAGLUTIDE 1.5 MG/.5ML
1.5 INJECTION, SOLUTION SUBCUTANEOUS
COMMUNITY

## 2023-05-21 RX ORDER — CLOPIDOGREL BISULFATE 75 MG/1
TABLET ORAL DAILY
COMMUNITY

## 2023-07-21 ENCOUNTER — TELEPHONE (OUTPATIENT)
Age: 73
End: 2023-07-21

## 2023-07-21 NOTE — TELEPHONE ENCOUNTER
BLAYNEM for patient to call and reschedule 8/15 appointment with Dr Olga Pappas at the [address] [place] [address]. Thanks.

## 2023-08-30 ENCOUNTER — OFFICE VISIT (OUTPATIENT)
Age: 73
End: 2023-08-30
Payer: COMMERCIAL

## 2023-08-30 VITALS
DIASTOLIC BLOOD PRESSURE: 70 MMHG | HEIGHT: 62 IN | WEIGHT: 151 LBS | HEART RATE: 85 BPM | BODY MASS INDEX: 27.79 KG/M2 | OXYGEN SATURATION: 95 % | SYSTOLIC BLOOD PRESSURE: 140 MMHG

## 2023-08-30 DIAGNOSIS — I73.9 PERIPHERAL VASCULAR DISEASE (HCC): ICD-10-CM

## 2023-08-30 DIAGNOSIS — I10 ESSENTIAL (PRIMARY) HYPERTENSION: ICD-10-CM

## 2023-08-30 DIAGNOSIS — E11.9 TYPE 2 DIABETES MELLITUS WITHOUT COMPLICATION, WITHOUT LONG-TERM CURRENT USE OF INSULIN (HCC): ICD-10-CM

## 2023-08-30 DIAGNOSIS — I25.118 CORONARY ARTERY DISEASE OF NATIVE ARTERY OF NATIVE HEART WITH STABLE ANGINA PECTORIS (HCC): Primary | ICD-10-CM

## 2023-08-30 PROCEDURE — 1123F ACP DISCUSS/DSCN MKR DOCD: CPT | Performed by: SPECIALIST

## 2023-08-30 PROCEDURE — 93000 ELECTROCARDIOGRAM COMPLETE: CPT | Performed by: SPECIALIST

## 2023-08-30 PROCEDURE — 99214 OFFICE O/P EST MOD 30 MIN: CPT | Performed by: SPECIALIST

## 2023-08-30 PROCEDURE — 3077F SYST BP >= 140 MM HG: CPT | Performed by: SPECIALIST

## 2023-08-30 PROCEDURE — 3078F DIAST BP <80 MM HG: CPT | Performed by: SPECIALIST

## 2023-08-30 NOTE — PROGRESS NOTES
Mariangel Chavarria is a 68 y.o. female    had concerns including Hypertension and Cholesterol Problem. Vitals:    08/30/23 1123   BP: (!) 140/70   Site: Left Upper Arm   Position: Sitting   Pulse: 85   SpO2: 95%   Weight: 151 lb (68.5 kg)   Height: 5' 2\" (1.575 m)        Chest pain NO    SOB NO    Dizziness NO    Swelling NO    Palpitations NO    Refills NO    Covid Vaccinated yes      1. Have you been to the ER, urgent care clinic since your last visit? Hospitalized since your last visit? NO    2. Have you seen or consulted any other health care providers outside of the [address] [place] [address] since your last visit? Include any pap smears or colon screening.  NO

## 2023-08-30 NOTE — PROGRESS NOTES
Amanda Bejarano MD. [facility]          Patient: Endy Graves  : 1950      Today's Date: 2023        HISTORY OF PRESENT ILLNESS:     History of Present Illness:    No cardiac complaints today. Doing well. PAST MEDICAL HISTORY:     Past Medical History:   Diagnosis Date    Coronary artery disease 2013    Diabetes mellitus ([facility]) 2013    Dyslipidemia 2013    Essential hypertension, benign 2013    Peripheral vascular disease, unspecified ([facility]) 2013       History reviewed. No pertinent surgical history. CURRENT MEDICATIONS:    .  Current Outpatient Medications   Medication Sig Dispense Refill    albuterol sulfate HFA (PROVENTIL;VENTOLIN;PROAIR) 108 (90 Base) MCG/ACT inhaler ceived the following from [facility]: Outside name: albuterol (PROVENTIL HFA, VENTOLIN HFA, PROAIR HFA) 90 mcg/actuation inhaler      amLODIPine (NORVASC) 10 MG tablet Take 1 tablet by mouth daily      cetirizine (ZYRTEC) 10 MG tablet Take 1 tablet by mouth daily      clopidogrel (PLAVIX) 75 MG tablet Take by mouth daily      dulaglutide (TRULICITY) 1.5 IM/9.2HV SC injection Inject 0.5 mLs into the skin every 7 days      ketoconazole (NIZORAL) 2 % shampoo Apply topically as needed      losartan-hydroCHLOROthiazide (HYZAAR) 100-25 MG per tablet Take 1 tablet by mouth daily      metFORMIN (GLUCOPHAGE) 500 MG tablet Take 1 tablet by mouth 2 times daily (with meals)      rosuvastatin (CRESTOR) 20 MG tablet Take 1 tablet by mouth nightly       No current facility-administered medications for this visit. No Known Allergies      SOCIAL HISTORY:     Social History     Tobacco Use    Smoking status: Every Day    Smokeless tobacco: Never   Substance Use Topics    Alcohol use:  Yes     Alcohol/week: 0.0 standard drinks    Drug use: No         FAMILY HISTORY:     Family History   Problem Relation Age of Onset    Heart Attack Mother          REVIEW OF SYMPTOMS:     Review of

## 2024-02-15 ENCOUNTER — TELEPHONE (OUTPATIENT)
Age: 74
End: 2024-02-15

## 2024-02-15 RX ORDER — AMLODIPINE BESYLATE 10 MG/1
10 TABLET ORAL DAILY
Qty: 90 TABLET | Refills: 1 | Status: SHIPPED | OUTPATIENT
Start: 2024-02-15

## 2024-02-15 NOTE — TELEPHONE ENCOUNTER
Pt called for refill of amlodipine - she's all out.     [facility] mail order # [phone]

## 2024-02-15 NOTE — TELEPHONE ENCOUNTER
Refill per VO of Dr. Christensen  Last appt: 8/30/2023    Future Appointments   Date Time Provider Department Center   9/3/2024 10:40 AM Chelle Christensen MD [facility]       Requested Prescriptions     Signed Prescriptions Disp Refills    amLODIPine (NORVASC) 10 MG tablet 90 tablet 1     Sig: Take 1 tablet by mouth daily     Authorizing Provider: CHELLE CHRISTENSEN     Ordering User: THERESE ESCOBEDO

## 2024-10-23 ENCOUNTER — OFFICE VISIT (OUTPATIENT)
Age: 74
End: 2024-10-23
Payer: COMMERCIAL

## 2024-10-23 VITALS
DIASTOLIC BLOOD PRESSURE: 60 MMHG | OXYGEN SATURATION: 98 % | HEART RATE: 77 BPM | WEIGHT: 144.2 LBS | HEIGHT: 62 IN | SYSTOLIC BLOOD PRESSURE: 122 MMHG | BODY MASS INDEX: 26.54 KG/M2

## 2024-10-23 DIAGNOSIS — I10 ESSENTIAL (PRIMARY) HYPERTENSION: Primary | ICD-10-CM

## 2024-10-23 DIAGNOSIS — I25.118 CORONARY ARTERY DISEASE OF NATIVE ARTERY OF NATIVE HEART WITH STABLE ANGINA PECTORIS (HCC): ICD-10-CM

## 2024-10-23 DIAGNOSIS — E78.5 DYSLIPIDEMIA: ICD-10-CM

## 2024-10-23 DIAGNOSIS — I73.9 PERIPHERAL VASCULAR DISEASE (HCC): ICD-10-CM

## 2024-10-23 PROCEDURE — 93000 ELECTROCARDIOGRAM COMPLETE: CPT | Performed by: SPECIALIST

## 2024-10-23 PROCEDURE — 3074F SYST BP LT 130 MM HG: CPT | Performed by: SPECIALIST

## 2024-10-23 PROCEDURE — 3078F DIAST BP <80 MM HG: CPT | Performed by: SPECIALIST

## 2024-10-23 PROCEDURE — 1123F ACP DISCUSS/DSCN MKR DOCD: CPT | Performed by: SPECIALIST

## 2024-10-23 PROCEDURE — 99214 OFFICE O/P EST MOD 30 MIN: CPT | Performed by: SPECIALIST

## 2024-10-23 NOTE — PROGRESS NOTES
Chief Complaint   Patient presents with    Annual Exam    Coronary Artery Disease    Hypertension     PVD     Vitals:    10/23/24 1358   BP: 122/60   Site: Left Upper Arm   Position: Sitting   Cuff Size: Medium Adult   Pulse: 77   SpO2: 98%   Weight: 65.4 kg (144 lb 3.2 oz)   Height: 1.575 m (5' 2\")       Chest pain NO     ER, urgent care, or hospitalized outside of [facility] since your last visit?  NO     Refills NO   
non-specific T wave abn   EKG 4/23/19 - NSR, non-specific T wave abn    EKG 7/16/20 - NSR, non-specific T wave abn    EKG 8/6/21 - NSR, non-specific T wave abn       EKG 8/30/23 - NSR, normal   EKG 10/23/24 - NSR, normal         ASSESSMENT AND PLAN:     Assessment and Plan:    1)  CAD  - see history above   - No CP -- has class 1 VERMA    - cont plavix, statin, CCB, ARB      2) LE PAD and carotid bruits     - LE bypass surgery 11/17 (Dr. Nieto)   - Denies claudication   - She says she sees Vascular for routine FU     - plavix was added by Vascular surgery    - carotid dopplers followed with Vascular surgery       3) HTN   - BP OK    - cont current meds above       4) Dyslipidemia   - cont statin    - lipids followed by PCP - LDL has been < 55     5) T2DM  - seeing PCP   - doing OK      6) Smokes 4-5 cigarette every day, occasionally drinks a cocktail      7) See me in 12 months.    Born and raised in [place].  Was a  for the [employer].        Chelle Christensen MD, FACC    [facility]  [facility]  [address], [address]  [address]  [place], [place]  [address]  [place] [address]  Ph: [phone]   Ph [phone]

## 2024-10-23 NOTE — PATIENT INSTRUCTIONS
Patient Education        Quitting Tobacco: Care Instructions  Quitting tobacco is much harder than simply changing a habit. Nicotine cravings make it hard to quit, but you can do it. Your doctor will help you set up the plan that best meets your needs.    You will miss the nicotine at first. You may feel short-tempered and grumpy. You may have trouble sleeping or thinking clearly. The urge to use tobacco may continue for a time.   Combining tools can raise your chances of success. You can use medicine along with counseling. And you can join a quit-tobacco program, such as the American Lung Association's Freedom from Smoking program.     Get support.  Reach out to family and friends, and find a counselor to help you quit. Join a support group, such as Nicotine Anonymous. Go to www.smokefree.gov to sign up for text messaging support.     Talk to your doctor or pharmacist about medicines that can help you quit.  Medicines can help with cravings and withdrawal symptoms. There are several over-the-counter choices, such as nicotine patches, gum, and lozenges.     After you quit, do not use tobacco again, not even once.  Get rid of all tobacco products and anything that reminds you of tobacco, such as ashtrays.     Avoid things that make you reach for tobacco.  Change your daily routine. Take a different route to work, or eat a meal in a different place.     Try to cut down on stress.  Find ways to calm yourself, such as taking a hot bath or doing deep breathing exercises.     Eat a healthy diet, and get regular exercise.  Having healthy habits may help you quit using tobacco.     Don't give up on quitting if you use tobacco again.  Most people quit and restart a few times before they quit for good.   Follow-up care is a key part of your treatment and safety. Be sure to make and go to all appointments, and call your doctor if you are having problems. It's also a good idea to know your test results and keep a list of the

## 2024-12-19 ENCOUNTER — TELEPHONE (OUTPATIENT)
Age: 74
End: 2024-12-19

## 2024-12-19 RX ORDER — AMLODIPINE BESYLATE 10 MG/1
10 TABLET ORAL DAILY
Qty: 90 TABLET | Refills: 3 | Status: SHIPPED | OUTPATIENT
Start: 2024-12-19

## 2024-12-19 NOTE — TELEPHONE ENCOUNTER
Patient states that she needs a refill on her medication (Amlodipine)     [facility] Mail Order  [phone]

## 2024-12-19 NOTE — TELEPHONE ENCOUNTER
Refill per VO of Dr. Christensen  Last appt: 10/23/2024    Future Appointments   Date Time Provider Department Center   10/28/2025  3:00 PM Chelle Christensen MD [facility]       Requested Prescriptions     Signed Prescriptions Disp Refills    amLODIPine (NORVASC) 10 MG tablet 90 tablet 3     Sig: Take 1 tablet by mouth daily     Authorizing Provider: CHELLE CHRISTENSEN     Ordering User: SUNNY CONTRERAS

## 2025-07-24 ENCOUNTER — TELEPHONE (OUTPATIENT)
Age: 75
End: 2025-07-24

## 2025-07-24 NOTE — TELEPHONE ENCOUNTER
Pt called in stating that she was just d/c from [facility] hospital after having an upper GI bleed.  They started her on protonix and asked that she hold plavix, amlodipine, losartan x3 months.    Let her know that I would have to check with provider about holding the medications.      She is to go back for scope in 3 months to reassess.    Pt requesting sooner follow up visit.  Made hosp follow up for next week.    Confirmed call back # as [phone]    Future Appointments   Date Time Provider Department Center   7/31/2025  3:20 PM Courtney Mendez APRN - [facility]   10/28/2025  3:00 PM Chelle Christensen MD [facility]     Updated care everywhere.

## 2025-07-25 NOTE — TELEPHONE ENCOUNTER
Spoke to pt,  Relayed recommendations  Per JACOBO Mendez NP: \"  Yes ok to hold meds. Monitor BP and let us know if BP spikes and we can slowly restart meds... would get guidance on restarting antiplatelet from GI... which sounds like 3 months since that is when they will scope again. October is ok for appointment :) unless she really needs something!   \"  Cancelled apt for next week.  Pt agreeable to plan.